# Patient Record
Sex: FEMALE | Race: OTHER | HISPANIC OR LATINO | URBAN - METROPOLITAN AREA
[De-identification: names, ages, dates, MRNs, and addresses within clinical notes are randomized per-mention and may not be internally consistent; named-entity substitution may affect disease eponyms.]

---

## 2018-11-20 ENCOUNTER — OUTPATIENT (OUTPATIENT)
Dept: OUTPATIENT SERVICES | Facility: HOSPITAL | Age: 58
LOS: 1 days | End: 2018-11-20
Payer: COMMERCIAL

## 2018-11-20 PROCEDURE — A9587: CPT

## 2018-11-20 PROCEDURE — 78815 PET IMAGE W/CT SKULL-THIGH: CPT | Mod: 26

## 2018-11-20 PROCEDURE — 78815 PET IMAGE W/CT SKULL-THIGH: CPT

## 2019-01-25 PROBLEM — Z00.00 ENCOUNTER FOR PREVENTIVE HEALTH EXAMINATION: Status: ACTIVE | Noted: 2019-01-25

## 2019-01-31 ENCOUNTER — APPOINTMENT (OUTPATIENT)
Dept: NEPHROLOGY | Facility: CLINIC | Age: 59
End: 2019-01-31
Payer: COMMERCIAL

## 2019-01-31 ENCOUNTER — LABORATORY RESULT (OUTPATIENT)
Age: 59
End: 2019-01-31

## 2019-01-31 VITALS — DIASTOLIC BLOOD PRESSURE: 70 MMHG | SYSTOLIC BLOOD PRESSURE: 113 MMHG | HEART RATE: 60 BPM

## 2019-01-31 VITALS — WEIGHT: 167 LBS

## 2019-01-31 DIAGNOSIS — Z90.2 ACQUIRED ABSENCE OF LUNG [PART OF]: ICD-10-CM

## 2019-01-31 PROCEDURE — 99245 OFF/OP CONSLTJ NEW/EST HI 55: CPT | Mod: 25

## 2019-01-31 PROCEDURE — 36415 COLL VENOUS BLD VENIPUNCTURE: CPT

## 2019-01-31 RX ORDER — SIMVASTATIN 20 MG/1
20 TABLET, FILM COATED ORAL
Qty: 90 | Refills: 3 | Status: ACTIVE | COMMUNITY
Start: 2019-01-31

## 2019-01-31 NOTE — ASSESSMENT
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 The patient has been counseled never to stop taking their medications without discussing it with me or another doctor. The patient has been counseled on risk of acute renal failure and instructed to immediately call and speak with me or go immediately to ER with any severe symptoms, nausea, vomiting, diarrhea, chest pain, or shortness of breath. The patient has been counseled on avoiding NSAIDs. Reducing or avoiding red meat and starting folate 800 mg qd has been advised. A counseling information sheet has been given. All their questions were answered.\par \par # HTN controlled.\par * Stop lisinopril.\par * Change atenolol to toprol (not cleared by kidney).\par * The patient's blood pressure was checked with the [employer] HEM-907XL using the SPRINT trial protocol after sitting quietly in an empty room with arm supported, back supported, and feet on the floor for 5 minutes. The average of 3 readings were taken. \par * The patient has been  advised to check their BP at home with an automatic arm cuff, write down the readings, and reach me directly on the phone immediately if they are persistently > 180 systolic or if SBP is less than 100 or if lightheadedness develops. They were advised to bring in all blood pressure readings and medications next visit.\par * The patient has been counseled that they have a a significant medical condition and regular office followup (at least every 3 weeks for now) is essential for monitoring, and that it is their responsibility to make follow up appointments. The patient also understands that they must never stop or change any medications without discussing this with me (or another physician). A counseling information sheet has been given. All their questions were answered. \par \par # DM.\par * Stop metformin (contraindicated with GFR < 30).\par * As she was only taking 500 mg daily, I do not expect significant rise in blood sugar and will follow HGA1c and BGs.\par \par # High Medical Complexity\par * Diagnostic and management options are extensive (4 stable problems, 2 worsening or uncontrolled problems, or or 1 new problem with workup).\par * The risk of complications, morbidity, or mortality is high. The patient has chronic organ failure (advanced chronic kidney disease) and is at risk for progression.\par * Amount / complexity of data reviewed is extensive. Clinical laboratory tests have been ordered. I have decided to obtain old records. (The patient has been advised to call their office and arrange copies of notes / labs to be sent.) I have reviewed the previous records that are available and summarized them in the HPI. \par \par

## 2019-01-31 NOTE — CONSULT LETTER
[FreeTextEntry1] : Dear Manuel,\par \par I had the pleasure of seeing Kati Alvarez in consultation for kidney disease. My note is attached.\par \par Thank you for the opportunity to participate in the care of your patient.\par \par Please call me on my cell phone at [phone] or in the office at [phone] if you have any questions.  Best regards,\par \par Osbaldo\par \par Osbaldo Hutton MD, FACP, FASN\par [email or web address]\par Office: [phone]\par Mobile: [phone]

## 2019-01-31 NOTE — REVIEW OF SYSTEMS
[Feeling Tired] : feeling tired [Nasal Discharge] : nasal discharge [Easy Bruising] : a tendency for easy bruising [Negative] : Heme/Lymph [FreeTextEntry8] : yeast infection

## 2019-02-12 LAB
25(OH)D3 SERPL-MCNC: 12.8 NG/ML
ALBUMIN SERPL ELPH-MCNC: 4.1 G/DL
ALP BLD-CCNC: 303 U/L
ALT SERPL-CCNC: 13 U/L
ANION GAP SERPL CALC-SCNC: 14 MMOL/L
APPEARANCE: CLEAR
AST SERPL-CCNC: 20 U/L
BASOPHILS # BLD AUTO: 0.08 K/UL
BASOPHILS NFR BLD AUTO: 0.9 %
BILIRUB SERPL-MCNC: 0.4 MG/DL
BILIRUBIN URINE: NEGATIVE
BLOOD URINE: ABNORMAL
BUN SERPL-MCNC: 24 MG/DL
CALCIUM SERPL-MCNC: 9.5 MG/DL
CALCIUM SERPL-MCNC: 9.5 MG/DL
CHLORIDE SERPL-SCNC: 109 MMOL/L
CO2 SERPL-SCNC: 15 MMOL/L
COLOR: YELLOW
CREAT SERPL-MCNC: 2.1 MG/DL
CREAT SPEC-SCNC: 55 MG/DL
CREAT SPEC-SCNC: 55 MG/DL
CREAT/PROT UR: 1.6 RATIO
DEPRECATED KAPPA LC FREE/LAMBDA SER: 1.21 RATIO
EOSINOPHIL # BLD AUTO: 0.23 K/UL
EOSINOPHIL NFR BLD AUTO: 2.6 %
FERRITIN SERPL-MCNC: 95 NG/ML
GLUCOSE QUALITATIVE U: 1000 MG/DL
GLUCOSE SERPL-MCNC: 101 MG/DL
HBA1C MFR BLD HPLC: 6 %
HCT VFR BLD CALC: 37.5 %
HGB BLD-MCNC: 12 G/DL
IGA 24H UR QL IFE: NORMAL
KAPPA LC CSF-MCNC: 4.15 MG/DL
KAPPA LC SERPL-MCNC: 5.02 MG/DL
KETONES URINE: NEGATIVE
LEUKOCYTE ESTERASE URINE: NEGATIVE
LYMPHOCYTES # BLD AUTO: 0.94 K/UL
LYMPHOCYTES NFR BLD AUTO: 10.5 %
M PROTEIN SPEC IFE-MCNC: NORMAL
MAGNESIUM SERPL-MCNC: 2.1 MG/DL
MAN DIFF?: NORMAL
MCHC RBC-ENTMCNC: 28.6 PG
MCHC RBC-ENTMCNC: 32 GM/DL
MCV RBC AUTO: 89.5 FL
MICROALBUMIN 24H UR DL<=1MG/L-MCNC: 12.9 MG/DL
MICROALBUMIN/CREAT 24H UR-RTO: 235 MG/G
MONOCYTES # BLD AUTO: 0.47 K/UL
MONOCYTES NFR BLD AUTO: 5.3 %
NEUTROPHILS # BLD AUTO: 7.12 K/UL
NEUTROPHILS NFR BLD AUTO: 76.3 %
NITRITE URINE: NEGATIVE
PARATHYROID HORMONE INTACT: 250 PG/ML
PH URINE: 6
PHOSPHATE SERPL-MCNC: 2.6 MG/DL
PLATELET # BLD AUTO: 220 K/UL
POTASSIUM SERPL-SCNC: 3.7 MMOL/L
PROT SERPL-MCNC: 7.7 G/DL
PROT UR-MCNC: 86 MG/DL
PROTEIN URINE: 100 MG/DL
RBC # BLD: 4.19 M/UL
RBC # FLD: 16 %
SODIUM SERPL-SCNC: 138 MMOL/L
SPECIFIC GRAVITY URINE: 1.02
T4 FREE SERPL-MCNC: 1 NG/DL
TSH SERPL-ACNC: 2.81 UIU/ML
URATE SERPL-MCNC: 2.5 MG/DL
UROBILINOGEN URINE: NEGATIVE MG/DL
VIT B12 SERPL-MCNC: 1275 PG/ML
WBC # FLD AUTO: 8.92 K/UL

## 2019-02-20 ENCOUNTER — LABORATORY RESULT (OUTPATIENT)
Age: 59
End: 2019-02-20

## 2019-02-20 ENCOUNTER — APPOINTMENT (OUTPATIENT)
Dept: NEPHROLOGY | Facility: CLINIC | Age: 59
End: 2019-02-20
Payer: COMMERCIAL

## 2019-02-20 VITALS — WEIGHT: 170 LBS

## 2019-02-20 PROCEDURE — 36415 COLL VENOUS BLD VENIPUNCTURE: CPT

## 2019-02-20 PROCEDURE — 99215 OFFICE O/P EST HI 40 MIN: CPT | Mod: 25

## 2019-02-20 NOTE — HISTORY OF PRESENT ILLNESS
[FreeTextEntry1] : Kindly referred by Dr. Manuel Arevalo for decrease kidney function. \par \par * HTN controlled, 120s - 130s at home. No lightheadedness. Compliant with medications. Following low salt diet. * Proteinuria 1.6 g/g. *  Metformin was stopped last visit. Nausea resolved.* CKD stable, creatinine 2.1. * HCO3 15. She has not started baking soda as instructed. 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 She has never had flushing or carcinoid syndrome. Since 2013, she has been treated with monthly sandostatin and in 2014 was treated with everolimus (afinitor). In 2017, she was treated with 5FU (Nov 17 - July 18) and another type of chemotherapy, though she is not on this currently. She was diagnosed with diabetes in 2017 and started on metformin, which she is still taking. In 2017 she was also started on lisinopril by her cardiologist "to protect her kidneys". She first became aware of decreased kidney function in about November of 2018. She is uncertain what her creatinine is but believes her kidney function is 17 percent. (See below.) Labs have been requested and are currently pending. A renal ultrasound on 12/3/18 revealed 9.5/9.8 kidneys which were echogenic. There was no hydronephrosis. The patient denies exposure to chronic NSAIDs, phosphate bowel preparations, PPIs, green smoothies, creatine, or herbal supplements.\par \par One episode of kidney stone which passed spontaneously in 2004. \par \par ADDENDUM: Creatinine 2.07, GFR 25 on labs from 1/7/19. Per discussion with Dr. Arevalo, her creatinine has increased in May 2018. It has been stable since November 2018. \par \par \par

## 2019-02-20 NOTE — CONSULT LETTER
[FreeTextEntry1] : Dear Manuel,\par \par I had the pleasure of seeing Kati Alvarez in followup for kidney disease. My note is attached.\par \par Thank you for the opportunity to participate in the care of your patient.\par \par Please call me on my cell phone at [phone] or in the office at [phone] if you have any questions.\par \par Best regards,\par \par Osbaldo\par \par Osbaldo Hutton MD, FACP, FASN\par [email or web address]\par Office: [phone]\par Mobile: [phone]

## 2019-02-20 NOTE — ASSESSMENT
[FreeTextEntry1] : # Stable TE of unclear cause, which nonnephrotic proteinuria \par * This may be due to 5FU, but this is a rare cause of TE.\par * Currently, risks of renal biopsy outweigh benefits given small kidney size.\par * Recheck labs, including other serologic studies.\par \par # HTN controlled.\par * The patient's blood pressure was checked with the [employer] HEM-907XL using the SPRINT trial protocol after sitting quietly in an empty room with arm supported, back supported, and feet on the floor for 5 minutes. The average of 3 readings were taken. \par * The patient has been  advised to check their BP at home with an automatic arm cuff, write down the readings, and reach me directly on the phone immediately if they are persistently > 180 systolic or if SBP is less than 100 or if lightheadedness develops. They were advised to bring in all blood pressure readings and medications next visit.\par * The patient has been counseled that they have a a significant medical condition and regular office followup (at least every 1 months for now) is essential for monitoring, and that it is their responsibility to make follow up appointments. The patient also understands that they must never stop or change any medications without discussing this with me (or another physician). A counseling information sheet has been given. All their questions were answered. \par \par # DM.\par * Follow up labs off metformin.\par * Referral to endocrinology.\par \par # Carcinoid tumor.\par * Follow up with Dr. Arevalo. \par \par # Acidosis.\par * Start sodium bicarb 650 bid. \par \par # High Medical Complexity\par * Diagnostic and management options are extensive (4 stable problems, 2 worsening or uncontrolled problems, or or 1 new problem with workup).\par * The risk of complications, morbidity, or mortality is high. The patient has chronic organ failure (advanced chronic kidney disease) and is at risk for progression.\par \par

## 2019-02-20 NOTE — REVIEW OF SYSTEMS
[Feeling Tired] : feeling tired [Easy Bruising] : a tendency for easy bruising [Negative] : Heme/Lymph

## 2019-03-03 LAB
ALBUMIN SERPL ELPH-MCNC: 4.6 G/DL
ALP BLD-CCNC: 352 U/L
ALT SERPL-CCNC: 14 U/L
ANA SER IF-ACNC: NEGATIVE
ANION GAP SERPL CALC-SCNC: 17 MMOL/L
APPEARANCE: CLEAR
AST SERPL-CCNC: 19 U/L
BASOPHILS # BLD AUTO: 0.05 K/UL
BASOPHILS NFR BLD AUTO: 0.6 %
BILIRUB SERPL-MCNC: 0.5 MG/DL
BILIRUBIN URINE: NEGATIVE
BLOOD URINE: ABNORMAL
BUN SERPL-MCNC: 24 MG/DL
C3 SERPL-MCNC: 144 MG/DL
C4 SERPL-MCNC: 30 MG/DL
CALCIUM SERPL-MCNC: 9.4 MG/DL
CALCIUM SERPL-MCNC: 9.4 MG/DL
CHLORIDE SERPL-SCNC: 109 MMOL/L
CO2 SERPL-SCNC: 15 MMOL/L
COLOR: YELLOW
CREAT SERPL-MCNC: 2.18 MG/DL
CREAT SPEC-SCNC: 87 MG/DL
CREAT/PROT UR: 1.6 RATIO
DSDNA AB SER-ACNC: <12 IU/ML
EOSINOPHIL # BLD AUTO: 0.13 K/UL
EOSINOPHIL NFR BLD AUTO: 1.7 %
GLUCOSE QUALITATIVE U: ABNORMAL
GLUCOSE SERPL-MCNC: 124 MG/DL
HBV SURFACE AG SER QL: NONREACTIVE
HCT VFR BLD CALC: 40.3 %
HCV AB SER QL: NONREACTIVE
HCV S/CO RATIO: 0.15 S/CO
HGB BLD-MCNC: 11.9 G/DL
HIV1+2 AB SPEC QL IA.RAPID: NONREACTIVE
IMM GRANULOCYTES NFR BLD AUTO: 1.4 %
KETONES URINE: NEGATIVE
LEUKOCYTE ESTERASE URINE: ABNORMAL
LYMPHOCYTES # BLD AUTO: 0.87 K/UL
LYMPHOCYTES NFR BLD AUTO: 11.2 %
MAN DIFF?: NORMAL
MCHC RBC-ENTMCNC: 28.5 PG
MCHC RBC-ENTMCNC: 29.5 GM/DL
MCV RBC AUTO: 96.6 FL
MONOCYTES # BLD AUTO: 0.64 K/UL
MONOCYTES NFR BLD AUTO: 8.2 %
MPO AB + PR3 PNL SER: NORMAL
NEUTROPHILS # BLD AUTO: 5.97 K/UL
NEUTROPHILS NFR BLD AUTO: 76.9 %
NITRITE URINE: NEGATIVE
PARATHYROID HORMONE INTACT: 252 PG/ML
PH URINE: 6
PHOSPHATE SERPL-MCNC: 2.5 MG/DL
PLATELET # BLD AUTO: 236 K/UL
POTASSIUM SERPL-SCNC: 4.2 MMOL/L
PROT SERPL-MCNC: 7.4 G/DL
PROT UR-MCNC: 139 MG/DL
PROTEIN URINE: ABNORMAL
RBC # BLD: 4.17 M/UL
RBC # FLD: 15.8 %
RHEUMATOID FACT SER QL: <10 IU/ML
SODIUM SERPL-SCNC: 141 MMOL/L
SPECIFIC GRAVITY URINE: 1.02
UROBILINOGEN URINE: NORMAL
WBC # FLD AUTO: 7.77 K/UL

## 2019-03-18 ENCOUNTER — APPOINTMENT (OUTPATIENT)
Dept: GYNECOLOGIC ONCOLOGY | Facility: CLINIC | Age: 59
End: 2019-03-18
Payer: COMMERCIAL

## 2019-03-18 VITALS
BODY MASS INDEX: 29.53 KG/M2 | OXYGEN SATURATION: 98 % | HEIGHT: 64 IN | SYSTOLIC BLOOD PRESSURE: 133 MMHG | WEIGHT: 173 LBS | DIASTOLIC BLOOD PRESSURE: 84 MMHG | HEART RATE: 78 BPM

## 2019-03-18 PROCEDURE — 99205 OFFICE O/P NEW HI 60 MIN: CPT

## 2019-03-18 RX ORDER — LEVOTHYROXINE SODIUM 50 UG/1
50 TABLET ORAL DAILY
Qty: 90 | Refills: 3 | Status: COMPLETED | COMMUNITY
Start: 2019-01-31 | End: 2019-03-18

## 2019-03-18 RX ORDER — LANREOTIDE ACETATE 120 MG/.5ML
INJECTION SUBCUTANEOUS
Refills: 0 | Status: ACTIVE | COMMUNITY

## 2019-03-18 RX ORDER — OMEPRAZOLE MAGNESIUM 10 MG/1
10 GRANULE, DELAYED RELEASE ORAL
Refills: 0 | Status: ACTIVE | COMMUNITY

## 2019-03-19 ENCOUNTER — RESULT CHARGE (OUTPATIENT)
Age: 59
End: 2019-03-19

## 2019-03-20 NOTE — PHYSICAL EXAM
[Normal] : Recto-Vaginal Exam: Normal [de-identified] : vertical and pfannensteil scars from prior csections [de-identified] : 8 week size uterus, anterior, possibly with scar to anterior abdominal wall.

## 2019-03-20 NOTE — REVIEW OF SYSTEMS
[Negative] : Musculoskeletal [Easy Bruising] : easy bruising [Recent Wt Gain ___ Lbs] : recent weight gain of [unfilled] lbs [FreeTextEntry4] : frequency [FreeTextEntry2] : sometimes headaches

## 2019-03-20 NOTE — HISTORY OF PRESENT ILLNESS
[FreeTextEntry1] : Problem\par 1)Complex atypical hyperplasia \par 2)Endometrial polyp\par \par Previous Therapy\par 1)Pap 2/18/19\par    a)NILM, HPV Negative \par 2)EMB 2/27/19\par    a)Focal complex atypical endometrial hyperplasia, disordered proliferative endometrium with metaplasia features suggestive of endometrial polyp

## 2019-03-20 NOTE — ASSESSMENT
[FreeTextEntry1] : With the aid of diagrams, we reviewed the findings and discussed the pathology results. \par Endometrial hyperplasia is caused by an imbalance between estrogen and progesterone, often described as “unopposed estrogen.” The risk of complex hyperplasia with atypia progressing to endometrial cancer is above 30%. Recent data has shown that there is a 40% chance that endometrial cancer will be discovered in the uterus at the time of surgery for complex atypical hyperplasia.\par \par Recommendation is for surgical removal of the uterus with removal of the bilateral tubes and ovaries. Different surgical approaches discussed including minimally invasive and open approaches. I recommend advanced laparoscopic robotic assisted total hysterectomy, bilateral salpingo-oophorectomy with sentinel lymph node biopsy. If sentinel lymph node biopsy is successful a full lymph node dissection can be avoided. If the SLN does not map, then frozen section will be performed on uterus. If there is evidence of malignancy with risk factors that render her high intermediate risk, a pelvic and possible para-aortic lymph node dissection will be performed. \par \par Hormonal treatment also discussed, but this would not be standard of care in a post-menopausal patient. Progestin therapies can be considered for patients who are poor surgical candidates, however risk of recurrent disease emphasized.\par \par Surgical Complications that include, but are not limited to: bleeding, infection, injury to other organs including bowel, bladder, ureters, blood vessels, nerves, infections, blood clots, lymphedema, pneumonia, wound complications and prolonged hospital stay have all been discussed with the patient. I have also provided her with the diagrams.\par \par I discussed her case with Dr. Pierce who describes her carcinoid history as indolent and believes she is stable enough to proceed with surgery.\par \par [] Discuss case with Dr Pierce [phone] and Dr. White [phone]\par [] Recommend TLH/BSO/SLNB\par [] Imaging studies requested from [facility]\par [] Medical clearance\par \par \par The total encounter time was 70 minutes, of which over 50% was spent face-to-face, examining and counseling the patient, or coordinating care.

## 2019-03-20 NOTE — CHIEF COMPLAINT
[FreeTextEntry1] : New patient consult. 58 y.o patient referred by Dr. Garcia presents today for CAH. patient reports an episode of bleeding last July while on chemotherapy for management of carcinoid. She was first diagnosed with a carcinoid tumor of the lung in , she then had removal of a tumor from her heart and most recently has multiple tumor nodules on the liver. She had chemotherapy with 5-FU for 9 months, last treatment was in 2018. Reports stable disease off treatment. \par \par GynHx: CAH\par ObHx:  Section x2 (one child passed away after delivery)\par PmHx: CKD, hypothyroid, HTN, high cholesterol\par SurgHx: \par Meds: Synthroid, Toprol, Simvastatin, Aspring, Prilosec, Sumatolin\par All: PCN\par \par

## 2019-03-20 NOTE — DISCUSSION/SUMMARY
[Pre Op] : The differential diagnosis was discussed in detail. The indications, risks, benefits and alternatives were discussed. [unfilled] expressed an understanding of the treatment rationale and her questions were answered to her apparent satisfaction. [FreeTextEntry2] : CAH [FreeTextEntry1] : With the aid of diagrams, we reviewed the findings and discussed the pathology results. \par Endometrial hyperplasia is caused by an imbalance between estrogen and progesterone, often described as “unopposed estrogen.” The risk of complex hyperplasia with atypia progressing to endometrial cancer is above 30%. Recent data has shown that there is a 40% chance that endometrial cancer will be discovered in the uterus at the time of surgery for complex atypical hyperplasia.\par \par Recommendation is for surgical removal of the uterus with removal of the bilateral tubes and ovaries. Different surgical approaches discussed including minimally invasive and open approaches. I recommend advanced laparoscopic robotic assisted total hysterectomy, bilateral salpingo-oophorectomy with sentinel lymph node biopsy. If sentinel lymph node biopsy is successful a full lymph node dissection can be avoided. If the SLN does not map, then frozen section will be performed on uterus. If there is evidence of malignancy with risk factors that render her high intermediate risk, a pelvic and possible para-aortic lymph node dissection will be performed. \par \par Surgical Complications that include, but are not limited to: bleeding, infection, injury to other organs including bowel, bladder, ureters, blood vessels, nerves, infections, blood clots, lymphedema, pneumonia, wound complications and prolonged hospital stay have all been discussed with the patient. I have also provided her with the diagrams.\par \par Surgical scheduling was discussed and instructions for optimization prior to surgery were given. She will have a clear liquid diet one day prior, and will follow the Enhanced Recovery after Surgery (ERAS) protocol.  No aspirin or NSAID products for 1 week prior. She will choose a surgical date.\par Slide review and medical clearance. \par \par The total encounter time was 60 minutes, of which over 50% was spent face-to-face, examining and counseling the patient, or coordinating care.  \par

## 2019-03-26 ENCOUNTER — APPOINTMENT (OUTPATIENT)
Dept: NEPHROLOGY | Facility: CLINIC | Age: 59
End: 2019-03-26
Payer: COMMERCIAL

## 2019-03-26 ENCOUNTER — LABORATORY RESULT (OUTPATIENT)
Age: 59
End: 2019-03-26

## 2019-03-26 VITALS — BODY MASS INDEX: 29.52 KG/M2 | WEIGHT: 172 LBS

## 2019-03-26 VITALS — HEART RATE: 77 BPM | SYSTOLIC BLOOD PRESSURE: 121 MMHG | DIASTOLIC BLOOD PRESSURE: 76 MMHG

## 2019-03-26 DIAGNOSIS — N17.9 ACUTE KIDNEY FAILURE, UNSPECIFIED: ICD-10-CM

## 2019-03-26 PROCEDURE — 99215 OFFICE O/P EST HI 40 MIN: CPT | Mod: 25

## 2019-03-26 PROCEDURE — 36415 COLL VENOUS BLD VENIPUNCTURE: CPT

## 2019-03-26 NOTE — ASSESSMENT
[FreeTextEntry1] : # Stable TE and stage 4 CKD of unclear cause. \par * Nonnephrotic proteinuria. Serologies negative.\par * This may be due to 5FU, but this is a rare cause of TE.\par * Currently, risks of renal biopsy outweigh benefits given smaller kidney size and echogenicity, which suggests chronicity.\par * The patient has been counseled that chronic kidney disease is a significant condition and regular office followup with me (at least every 1 - 2 months for now) is essential for monitoring, and that it is their responsibility to make a follow up appointment.\par * The patient has been counseled never to stop taking their medications without discussing it with me or another doctor.\par * The patient has been counseled on risk of acute renal failure and instructed to immediately call and speak with me or go immediately to ER with any severe symptoms, nausea, vomiting, diarrhea, chest pain, or shortness of breath.\par * The patient has been counseled on avoiding NSAIDs.\par * Reducing or avoiding red meat, following a relatively low oxalate diet, and starting folate 800 mg qd has been advised.\par * A counseling information sheet has been given and they were provided sufficient time to review this. All their questions were answered.\par \par # HTN controlled.\par * The patient's blood pressure was checked with the [employer] HEM-907XL using the SPRINT trial protocol after sitting quietly in an empty room with arm supported, back supported, and feet on the floor for 5 minutes. The average of 3 readings were taken. \par * The patient has been advised to check their BP at home with an automatic arm cuff, write down the readings, and reach me directly on the phone immediately if they are persistently > 180 systolic or if SBP is less than 100 or if lightheadedness develops. They were advised to bring in all blood pressure readings and medications next visit.\par * The patient has been counseled that they have a a significant medical condition and regular office followup (at least every 2 months for now) is essential for monitoring, and that it is their responsibility to make follow up appointments.\par * The patient also has  been counsled that they must never stop or change any medications without discussing this with me (or another physician). \par * A counseling information sheet has been given and they were provided sufficient time to review this sheet. All their questions were answered.\par \par # DM.\par * Check HGA1c off metformin.\par * Endocrinology referral.\par \par # Carcinoid tumor.\par * Follow up closely with Dr. Arevalo.\par \par * Acidosis.\par * Recheck HCO3 on bicarb.\par \par # High Medical Complexity\par * Diagnostic and management options are extensive (4 stable problems, 2 worsening or uncontrolled problems, or 1 new problem with workup).\par * The risk of complications, morbidity, or mortality is high. The patient has chronic organ failure (advanced chronic kidney disease) and is at risk for progression.\par

## 2019-03-26 NOTE — CONSULT LETTER
[FreeTextEntry1] : Dear Dr. Arevalo,\par \par I had the pleasure of seeing Kati Alvarez in followup for kidney disease. My note is attached.\par \par Thank you for the opportunity to participate in the care of your patient.\par \par Please call me on my cell phone at [phone] or in the office at [phone] if you have any questions.\par \par Best regards,\par \par Osbaldo\par \par Osbaldo Hutton MD, FACP, FASN\par [email or web address]\par Office: [phone]\par Mobile: [phone]

## 2019-03-26 NOTE — HISTORY OF PRESENT ILLNESS
[FreeTextEntry1] : Kindly referred by Dr. Manuel Arevalo for decrease kidney function. \par \par * Progressive CKD. Creatinine 2.18. * HCO3 15 previously. Baking soda 650 bid started.  * HTN controlled. No lightheadedness. Compliant with medications. Following low salt diet. * Off metformin. * 1.6 g/g proteinuria. \par \par Previous history (20Feb19): * HTN controlled, 120s - 130s at home. No lightheadedness. Compliant with medications. Following low salt diet. * Proteinuria 1.6 g/g. *  Metformin was stopped last visit. Nausea resolved.* CKD stable, creatinine 2.1. * HCO3 15. She has not started baking soda as instructed. 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 She has never had flushing or carcinoid syndrome. Since 2013, she has been treated with monthly sandostatin and in 2014 was treated with everolimus (afinitor). In 2017, she was treated with 5FU (Nov 17 - July 18) and another type of chemotherapy, though she is not on this currently. She was diagnosed with diabetes in 2017 and started on metformin, which she is still taking. In 2017 she was also started on lisinopril by her cardiologist "to protect her kidneys". She first became aware of decreased kidney function in about November of 2018. She is uncertain what her creatinine is but believes her kidney function is 17 percent. (See below.) Labs have been requested and are currently pending. A renal ultrasound on 12/3/18 revealed 9.5/9.8 kidneys which were echogenic. There was no hydronephrosis. The patient denies exposure to chronic NSAIDs, phosphate bowel preparations, PPIs, green smoothies, creatine, or herbal supplements.\par \par One episode of kidney stone which passed spontaneously in 2004. \par \par ADDENDUM: Creatinine 2.07, GFR 25 on labs from 1/7/19. Per discussion with Dr. Arevalo, her creatinine has increased in May 2018. It has been stable since November 2018. \par \par \par

## 2019-03-27 ENCOUNTER — EMERGENCY (EMERGENCY)
Facility: HOSPITAL | Age: 59
LOS: 1 days | Discharge: HOME | End: 2019-03-27
Attending: EMERGENCY MEDICINE | Admitting: EMERGENCY MEDICINE
Payer: COMMERCIAL

## 2019-03-27 VITALS
WEIGHT: 172.62 LBS | SYSTOLIC BLOOD PRESSURE: 137 MMHG | RESPIRATION RATE: 18 BRPM | HEIGHT: 63 IN | OXYGEN SATURATION: 100 % | HEART RATE: 85 BPM | TEMPERATURE: 98 F | DIASTOLIC BLOOD PRESSURE: 82 MMHG

## 2019-03-27 VITALS
RESPIRATION RATE: 16 BRPM | OXYGEN SATURATION: 100 % | HEART RATE: 66 BPM | SYSTOLIC BLOOD PRESSURE: 133 MMHG | DIASTOLIC BLOOD PRESSURE: 75 MMHG

## 2019-03-27 DIAGNOSIS — N18.9 CHRONIC KIDNEY DISEASE, UNSPECIFIED: ICD-10-CM

## 2019-03-27 DIAGNOSIS — E78.5 HYPERLIPIDEMIA, UNSPECIFIED: ICD-10-CM

## 2019-03-27 DIAGNOSIS — I12.9 HYPERTENSIVE CHRONIC KIDNEY DISEASE WITH STAGE 1 THROUGH STAGE 4 CHRONIC KIDNEY DISEASE, OR UNSPECIFIED CHRONIC KIDNEY DISEASE: ICD-10-CM

## 2019-03-27 DIAGNOSIS — R79.9 ABNORMAL FINDING OF BLOOD CHEMISTRY, UNSPECIFIED: ICD-10-CM

## 2019-03-27 DIAGNOSIS — E11.9 TYPE 2 DIABETES MELLITUS WITHOUT COMPLICATIONS: ICD-10-CM

## 2019-03-27 DIAGNOSIS — R11.10 VOMITING, UNSPECIFIED: ICD-10-CM

## 2019-03-27 DIAGNOSIS — E87.6 HYPOKALEMIA: ICD-10-CM

## 2019-03-27 DIAGNOSIS — Z88.0 ALLERGY STATUS TO PENICILLIN: ICD-10-CM

## 2019-03-27 LAB
ALBUMIN SERPL ELPH-MCNC: 4.1 G/DL — SIGNIFICANT CHANGE UP (ref 3.3–5)
ALBUMIN SERPL ELPH-MCNC: 4.2 G/DL
ALP BLD-CCNC: 343 U/L
ALP SERPL-CCNC: 315 U/L — HIGH (ref 40–120)
ALT FLD-CCNC: 16 U/L — SIGNIFICANT CHANGE UP (ref 10–45)
ALT SERPL-CCNC: 18 U/L
ANION GAP SERPL CALC-SCNC: 13 MMOL/L — SIGNIFICANT CHANGE UP (ref 5–17)
ANION GAP SERPL CALC-SCNC: 15 MMOL/L
APPEARANCE: ABNORMAL
AST SERPL-CCNC: 19 U/L — SIGNIFICANT CHANGE UP (ref 10–40)
AST SERPL-CCNC: 20 U/L
BASOPHILS # BLD AUTO: 0.05 K/UL
BASOPHILS # BLD AUTO: 0.05 K/UL — SIGNIFICANT CHANGE UP (ref 0–0.2)
BASOPHILS NFR BLD AUTO: 0.6 %
BASOPHILS NFR BLD AUTO: 0.6 % — SIGNIFICANT CHANGE UP (ref 0–2)
BILIRUB SERPL-MCNC: 0.3 MG/DL
BILIRUB SERPL-MCNC: 0.4 MG/DL — SIGNIFICANT CHANGE UP (ref 0.2–1.2)
BILIRUBIN URINE: NEGATIVE
BLOOD URINE: ABNORMAL
BUN SERPL-MCNC: 16 MG/DL
BUN SERPL-MCNC: 18 MG/DL — SIGNIFICANT CHANGE UP (ref 7–23)
CALCIUM SERPL-MCNC: 8.9 MG/DL
CALCIUM SERPL-MCNC: 8.9 MG/DL
CALCIUM SERPL-MCNC: 8.9 MG/DL — SIGNIFICANT CHANGE UP (ref 8.4–10.5)
CHLORIDE SERPL-SCNC: 107 MMOL/L
CHLORIDE SERPL-SCNC: 109 MMOL/L — HIGH (ref 96–108)
CO2 SERPL-SCNC: 15 MMOL/L
CO2 SERPL-SCNC: 17 MMOL/L — LOW (ref 22–31)
COLOR: YELLOW
CREAT SERPL-MCNC: 2.21 MG/DL
CREAT SERPL-MCNC: 2.25 MG/DL — HIGH (ref 0.5–1.3)
EOSINOPHIL # BLD AUTO: 0.24 K/UL
EOSINOPHIL # BLD AUTO: 0.25 K/UL — SIGNIFICANT CHANGE UP (ref 0–0.5)
EOSINOPHIL NFR BLD AUTO: 2.9 %
EOSINOPHIL NFR BLD AUTO: 3.1 % — SIGNIFICANT CHANGE UP (ref 0–6)
ESTIMATED AVERAGE GLUCOSE: 128 MG/DL
GLUCOSE QUALITATIVE U: ABNORMAL
GLUCOSE SERPL-MCNC: 203 MG/DL
GLUCOSE SERPL-MCNC: 221 MG/DL — HIGH (ref 70–99)
HBA1C MFR BLD HPLC: 6.1 %
HCT VFR BLD CALC: 37.3 % — SIGNIFICANT CHANGE UP (ref 34.5–45)
HCT VFR BLD CALC: 38.6 %
HGB BLD-MCNC: 12.1 G/DL
HGB BLD-MCNC: 12.3 G/DL — SIGNIFICANT CHANGE UP (ref 11.5–15.5)
IMM GRANULOCYTES NFR BLD AUTO: 1 %
IMM GRANULOCYTES NFR BLD AUTO: 1 % — SIGNIFICANT CHANGE UP (ref 0–1.5)
KETONES URINE: NEGATIVE
LEUKOCYTE ESTERASE URINE: NEGATIVE
LYMPHOCYTES # BLD AUTO: 0.86 K/UL
LYMPHOCYTES # BLD AUTO: 1.02 K/UL — SIGNIFICANT CHANGE UP (ref 1–3.3)
LYMPHOCYTES # BLD AUTO: 12.5 % — LOW (ref 13–44)
LYMPHOCYTES NFR BLD AUTO: 10.2 %
MAGNESIUM SERPL-MCNC: 2 MG/DL
MAN DIFF?: NORMAL
MCHC RBC-ENTMCNC: 29 PG
MCHC RBC-ENTMCNC: 29.6 PG — SIGNIFICANT CHANGE UP (ref 27–34)
MCHC RBC-ENTMCNC: 31.3 GM/DL
MCHC RBC-ENTMCNC: 33 GM/DL — SIGNIFICANT CHANGE UP (ref 32–36)
MCV RBC AUTO: 89.7 FL — SIGNIFICANT CHANGE UP (ref 80–100)
MCV RBC AUTO: 92.6 FL
MONOCYTES # BLD AUTO: 0.58 K/UL — SIGNIFICANT CHANGE UP (ref 0–0.9)
MONOCYTES # BLD AUTO: 0.68 K/UL
MONOCYTES NFR BLD AUTO: 7.1 % — SIGNIFICANT CHANGE UP (ref 2–14)
MONOCYTES NFR BLD AUTO: 8.1 %
NEUTROPHILS # BLD AUTO: 6.15 K/UL — SIGNIFICANT CHANGE UP (ref 1.8–7.4)
NEUTROPHILS # BLD AUTO: 6.5 K/UL
NEUTROPHILS NFR BLD AUTO: 75.7 % — SIGNIFICANT CHANGE UP (ref 43–77)
NEUTROPHILS NFR BLD AUTO: 77.2 %
NITRITE URINE: NEGATIVE
NRBC # BLD: 0 /100 WBCS — SIGNIFICANT CHANGE UP (ref 0–0)
PARATHYROID HORMONE INTACT: 359 PG/ML
PH URINE: 6
PHOSPHATE SERPL-MCNC: 2.5 MG/DL
PLATELET # BLD AUTO: 215 K/UL — SIGNIFICANT CHANGE UP (ref 150–400)
PLATELET # BLD AUTO: 243 K/UL
POTASSIUM SERPL-MCNC: 3 MMOL/L — LOW (ref 3.5–5.3)
POTASSIUM SERPL-SCNC: 2.9 MMOL/L
POTASSIUM SERPL-SCNC: 3 MMOL/L — LOW (ref 3.5–5.3)
PROT SERPL-MCNC: 7.3 G/DL
PROT SERPL-MCNC: 8 G/DL — SIGNIFICANT CHANGE UP (ref 6–8.3)
PROTEIN URINE: ABNORMAL
RBC # BLD: 4.16 M/UL — SIGNIFICANT CHANGE UP (ref 3.8–5.2)
RBC # BLD: 4.17 M/UL
RBC # FLD: 15.3 % — HIGH (ref 10.3–14.5)
RBC # FLD: 15.6 %
SODIUM SERPL-SCNC: 137 MMOL/L
SODIUM SERPL-SCNC: 139 MMOL/L — SIGNIFICANT CHANGE UP (ref 135–145)
SPECIFIC GRAVITY URINE: 1.03
UROBILINOGEN URINE: NORMAL
WBC # BLD: 8.13 K/UL — SIGNIFICANT CHANGE UP (ref 3.8–10.5)
WBC # FLD AUTO: 8.13 K/UL — SIGNIFICANT CHANGE UP (ref 3.8–10.5)
WBC # FLD AUTO: 8.41 K/UL

## 2019-03-27 PROCEDURE — 85025 COMPLETE CBC W/AUTO DIFF WBC: CPT

## 2019-03-27 PROCEDURE — 93010 ELECTROCARDIOGRAM REPORT: CPT

## 2019-03-27 PROCEDURE — 96375 TX/PRO/DX INJ NEW DRUG ADDON: CPT

## 2019-03-27 PROCEDURE — 99284 EMERGENCY DEPT VISIT MOD MDM: CPT | Mod: 25

## 2019-03-27 PROCEDURE — 80053 COMPREHEN METABOLIC PANEL: CPT

## 2019-03-27 PROCEDURE — 96365 THER/PROPH/DIAG IV INF INIT: CPT

## 2019-03-27 PROCEDURE — 96376 TX/PRO/DX INJ SAME DRUG ADON: CPT

## 2019-03-27 PROCEDURE — 93005 ELECTROCARDIOGRAM TRACING: CPT

## 2019-03-27 PROCEDURE — 83735 ASSAY OF MAGNESIUM: CPT

## 2019-03-27 PROCEDURE — 36415 COLL VENOUS BLD VENIPUNCTURE: CPT

## 2019-03-27 RX ORDER — POTASSIUM CHLORIDE 20 MEQ
10 PACKET (EA) ORAL ONCE
Qty: 0 | Refills: 0 | Status: COMPLETED | OUTPATIENT
Start: 2019-03-27 | End: 2019-03-27

## 2019-03-27 RX ORDER — POTASSIUM CHLORIDE 20 MEQ
40 PACKET (EA) ORAL ONCE
Qty: 0 | Refills: 0 | Status: COMPLETED | OUTPATIENT
Start: 2019-03-27 | End: 2019-03-27

## 2019-03-27 RX ORDER — POTASSIUM CHLORIDE 20 MEQ
1 PACKET (EA) ORAL
Qty: 14 | Refills: 0 | OUTPATIENT
Start: 2019-03-27 | End: 2019-04-09

## 2019-03-27 RX ORDER — METOCLOPRAMIDE HCL 10 MG
10 TABLET ORAL ONCE
Qty: 0 | Refills: 0 | Status: COMPLETED | OUTPATIENT
Start: 2019-03-27 | End: 2019-03-27

## 2019-03-27 RX ORDER — SODIUM CHLORIDE 9 MG/ML
1000 INJECTION INTRAMUSCULAR; INTRAVENOUS; SUBCUTANEOUS ONCE
Qty: 0 | Refills: 0 | Status: COMPLETED | OUTPATIENT
Start: 2019-03-27 | End: 2019-03-27

## 2019-03-27 RX ORDER — POTASSIUM CHLORIDE 20 MEQ
40 PACKET (EA) ORAL ONCE
Qty: 0 | Refills: 0 | Status: DISCONTINUED | OUTPATIENT
Start: 2019-03-27 | End: 2019-03-27

## 2019-03-27 RX ORDER — ONDANSETRON 8 MG/1
4 TABLET, FILM COATED ORAL ONCE
Qty: 0 | Refills: 0 | Status: COMPLETED | OUTPATIENT
Start: 2019-03-27 | End: 2019-03-27

## 2019-03-27 RX ADMIN — ONDANSETRON 4 MILLIGRAM(S): 8 TABLET, FILM COATED ORAL at 14:22

## 2019-03-27 RX ADMIN — SODIUM CHLORIDE 1000 MILLILITER(S): 9 INJECTION INTRAMUSCULAR; INTRAVENOUS; SUBCUTANEOUS at 15:36

## 2019-03-27 RX ADMIN — SODIUM CHLORIDE 1000 MILLILITER(S): 9 INJECTION INTRAMUSCULAR; INTRAVENOUS; SUBCUTANEOUS at 14:14

## 2019-03-27 RX ADMIN — Medication 40 MILLIEQUIVALENT(S): at 17:24

## 2019-03-27 RX ADMIN — Medication 10 MILLIGRAM(S): at 15:29

## 2019-03-27 RX ADMIN — Medication 100 MILLIEQUIVALENT(S): at 14:07

## 2019-03-27 RX ADMIN — Medication 10 MILLIEQUIVALENT(S): at 15:31

## 2019-03-27 RX ADMIN — Medication 100 MILLIEQUIVALENT(S): at 15:32

## 2019-03-27 NOTE — ED ADULT NURSE NOTE - NSIMPLEMENTINTERV_GEN_ALL_ED
Implemented All Universal Safety Interventions:  D Hanis to call system. Call bell, personal items and telephone within reach. Instruct patient to call for assistance. Room bathroom lighting operational. Non-slip footwear when patient is off stretcher. Physically safe environment: no spills, clutter or unnecessary equipment. Stretcher in lowest position, wheels locked, appropriate side rails in place.

## 2019-03-27 NOTE — ED PROVIDER NOTE - OBJECTIVE STATEMENT
57yo female with pmhx of carcinoid s/p L pulmonectomy, CKD, DM, HTN, HLD presents with hypokalemia. Pt sees nephrologist Dr. Osblado Hutton monthly for monitoring of renal function, went yesterday for routine bloodwork. Called today and told potassium was low and she should come to ED. Pt reports she otherwise feels in normal state of health. Has had runny nose over past week and several episodes of vomiting, but denies fever, chills, chest pain, shortness of breath, constipation, diarrhea, urinary symptoms. States she  has had hypokalemia once previously during a prior hospitalization.

## 2019-03-27 NOTE — ED PROVIDER NOTE - NSFOLLOWUPINSTRUCTIONS_ED_ALL_ED_FT
Take potassium chloride 40 mEq daily  Follow up with Dr. Hutton in 2 weeks    Return to the Emergency Department if you develop fever>100.4F, chest pain, shortness of breath, abdominal pain, or any other concerns

## 2019-03-27 NOTE — ED ADULT NURSE NOTE - CHPI ED NUR SYMPTOMS NEG
no nausea/no pain/no back pain/no vomiting/no fever/no headache/no loss of consciousness/no chills/no decreased eating/drinking/no dizziness

## 2019-03-27 NOTE — ED PROVIDER NOTE - ATTENDING CONTRIBUTION TO CARE
57yo female with pmhx of carcinoid s/p L pulmonectomy, CKD, DM, HTN, HLD presents with hypokalemia. Pt sees nephrologist Dr. Osbaldo Hutton monthly for monitoring of renal function, went yesterday for routine bloodwork. Called today and told potassium was low and she should come to ED. Pt reports she otherwise feels in normal state of health. Has had runny nose over past week and several episodes of vomiting, but denies fever, chills, chest pain, shortness of breath, constipation, diarrhea, urinary symptoms. States she  has had hypokalemia once previously during a prior hospitalization.    Pe as per pa  Labs show hypokalemia to 3.0 replaced in ED  PO also given  Case discussed with Dr. Hutton who agrees on k supplementation and follow up in 2 weeks for repeat K  Pt otherwise aysmptomatic

## 2019-03-27 NOTE — ED ADULT NURSE NOTE - PMH
Carcinoid tumor Carcinoid tumor    CKD (chronic kidney disease)    DM (diabetes mellitus)    HTN (hypertension)

## 2019-03-27 NOTE — ED PROVIDER NOTE - CLINICAL SUMMARY MEDICAL DECISION MAKING FREE TEXT BOX
No U waves on EKG Afebrile and hemodynamically stable. K+ 3.0 on BMP in ED. No U waves on EKG. She has known CKD with Cr 2.2, mag wnl. She was unable to tolerate potassium solution in ED so given potassium chloride 40 mEq tab and repleted with potassium chloride 10mg IV x2. Discussed with Dr. Hutton who recommends continued supplementation with potassium chloride 40 mEq po daily with follow up in 2 weeks. Discussed plan with patient. Return precautions given.

## 2019-04-06 LAB
CREAT SPEC-SCNC: 89 MG/DL
CREAT SPEC-SCNC: 89 MG/DL
CREAT/PROT UR: 1.5 RATIO
MICROALBUMIN 24H UR DL<=1MG/L-MCNC: 21.3 MG/DL
MICROALBUMIN/CREAT 24H UR-RTO: 252 MG/G
PROT UR-MCNC: 132 MG/DL

## 2019-04-10 ENCOUNTER — OUTPATIENT (OUTPATIENT)
Dept: OUTPATIENT SERVICES | Facility: HOSPITAL | Age: 59
LOS: 1 days | End: 2019-04-10
Payer: COMMERCIAL

## 2019-04-10 DIAGNOSIS — Z01.818 ENCOUNTER FOR OTHER PREPROCEDURAL EXAMINATION: ICD-10-CM

## 2019-04-10 DIAGNOSIS — N85.02 ENDOMETRIAL INTRAEPITHELIAL NEOPLASIA [EIN]: ICD-10-CM

## 2019-04-10 PROBLEM — I10 ESSENTIAL (PRIMARY) HYPERTENSION: Chronic | Status: ACTIVE | Noted: 2019-03-27

## 2019-04-10 PROBLEM — N18.9 CHRONIC KIDNEY DISEASE, UNSPECIFIED: Chronic | Status: ACTIVE | Noted: 2019-03-27

## 2019-04-10 PROBLEM — D3A.00 BENIGN CARCINOID TUMOR OF UNSPECIFIED SITE: Chronic | Status: ACTIVE | Noted: 2019-03-27

## 2019-04-10 PROBLEM — E11.9 TYPE 2 DIABETES MELLITUS WITHOUT COMPLICATIONS: Chronic | Status: ACTIVE | Noted: 2019-03-27

## 2019-04-10 LAB
ALBUMIN SERPL ELPH-MCNC: 4.4 G/DL — SIGNIFICANT CHANGE UP (ref 3.3–5)
ALP SERPL-CCNC: 329 U/L — HIGH (ref 40–120)
ALT FLD-CCNC: 13 U/L — SIGNIFICANT CHANGE UP (ref 10–45)
ANION GAP SERPL CALC-SCNC: 14 MMOL/L — SIGNIFICANT CHANGE UP (ref 5–17)
AST SERPL-CCNC: 15 U/L — SIGNIFICANT CHANGE UP (ref 10–40)
BILIRUB SERPL-MCNC: 0.5 MG/DL — SIGNIFICANT CHANGE UP (ref 0.2–1.2)
BUN SERPL-MCNC: 28 MG/DL — HIGH (ref 7–23)
CALCIUM SERPL-MCNC: 9.2 MG/DL — SIGNIFICANT CHANGE UP (ref 8.4–10.5)
CHLORIDE SERPL-SCNC: 112 MMOL/L — HIGH (ref 96–108)
CO2 SERPL-SCNC: 17 MMOL/L — LOW (ref 22–31)
CREAT SERPL-MCNC: 2.07 MG/DL — HIGH (ref 0.5–1.3)
GLUCOSE SERPL-MCNC: 99 MG/DL — SIGNIFICANT CHANGE UP (ref 70–99)
INR BLD: 0.96 — SIGNIFICANT CHANGE UP (ref 0.88–1.16)
POTASSIUM SERPL-MCNC: 3.9 MMOL/L — SIGNIFICANT CHANGE UP (ref 3.5–5.3)
POTASSIUM SERPL-SCNC: 3.9 MMOL/L — SIGNIFICANT CHANGE UP (ref 3.5–5.3)
PROT SERPL-MCNC: 7.3 G/DL — SIGNIFICANT CHANGE UP (ref 6–8.3)
PROTHROM AB SERPL-ACNC: 10.8 SEC — SIGNIFICANT CHANGE UP (ref 10–12.9)
SODIUM SERPL-SCNC: 143 MMOL/L — SIGNIFICANT CHANGE UP (ref 135–145)

## 2019-04-10 PROCEDURE — 80053 COMPREHEN METABOLIC PANEL: CPT

## 2019-04-10 PROCEDURE — 85610 PROTHROMBIN TIME: CPT

## 2019-04-10 PROCEDURE — 71046 X-RAY EXAM CHEST 2 VIEWS: CPT | Mod: 26

## 2019-04-10 PROCEDURE — 71046 X-RAY EXAM CHEST 2 VIEWS: CPT

## 2019-04-18 ENCOUNTER — INPATIENT (INPATIENT)
Facility: HOSPITAL | Age: 59
LOS: 0 days | Discharge: HOME | DRG: 830 | End: 2019-04-19
Attending: OBSTETRICS & GYNECOLOGY | Admitting: OBSTETRICS & GYNECOLOGY
Payer: COMMERCIAL

## 2019-04-18 ENCOUNTER — RESULT REVIEW (OUTPATIENT)
Age: 59
End: 2019-04-18

## 2019-04-18 ENCOUNTER — APPOINTMENT (OUTPATIENT)
Dept: GYNECOLOGIC ONCOLOGY | Facility: HOSPITAL | Age: 59
End: 2019-04-18

## 2019-04-18 VITALS
TEMPERATURE: 98 F | RESPIRATION RATE: 16 BRPM | WEIGHT: 172.4 LBS | HEIGHT: 63 IN | OXYGEN SATURATION: 100 % | DIASTOLIC BLOOD PRESSURE: 63 MMHG | SYSTOLIC BLOOD PRESSURE: 128 MMHG | HEART RATE: 70 BPM

## 2019-04-18 DIAGNOSIS — Z87.442 PERSONAL HISTORY OF URINARY CALCULI: ICD-10-CM

## 2019-04-18 DIAGNOSIS — Z98.890 OTHER SPECIFIED POSTPROCEDURAL STATES: Chronic | ICD-10-CM

## 2019-04-18 DIAGNOSIS — E11.22 TYPE 2 DIABETES MELLITUS WITH DIABETIC CHRONIC KIDNEY DISEASE: ICD-10-CM

## 2019-04-18 DIAGNOSIS — N73.6 FEMALE PELVIC PERITONEAL ADHESIONS (POSTINFECTIVE): ICD-10-CM

## 2019-04-18 DIAGNOSIS — E89.0 POSTPROCEDURAL HYPOTHYROIDISM: ICD-10-CM

## 2019-04-18 DIAGNOSIS — Z90.49 ACQUIRED ABSENCE OF OTHER SPECIFIED PARTS OF DIGESTIVE TRACT: ICD-10-CM

## 2019-04-18 DIAGNOSIS — Z87.442 PERSONAL HISTORY OF URINARY CALCULI: Chronic | ICD-10-CM

## 2019-04-18 DIAGNOSIS — N85.2 HYPERTROPHY OF UTERUS: ICD-10-CM

## 2019-04-18 DIAGNOSIS — D25.9 LEIOMYOMA OF UTERUS, UNSPECIFIED: ICD-10-CM

## 2019-04-18 DIAGNOSIS — C7A.098 MALIGNANT CARCINOID TUMORS OF OTHER SITES: ICD-10-CM

## 2019-04-18 DIAGNOSIS — Z98.891 HISTORY OF UTERINE SCAR FROM PREVIOUS SURGERY: Chronic | ICD-10-CM

## 2019-04-18 DIAGNOSIS — Z90.2 ACQUIRED ABSENCE OF LUNG [PART OF]: Chronic | ICD-10-CM

## 2019-04-18 DIAGNOSIS — Z41.9 ENCOUNTER FOR PROCEDURE FOR PURPOSES OTHER THAN REMEDYING HEALTH STATE, UNSPECIFIED: Chronic | ICD-10-CM

## 2019-04-18 DIAGNOSIS — I12.9 HYPERTENSIVE CHRONIC KIDNEY DISEASE WITH STAGE 1 THROUGH STAGE 4 CHRONIC KIDNEY DISEASE, OR UNSPECIFIED CHRONIC KIDNEY DISEASE: ICD-10-CM

## 2019-04-18 DIAGNOSIS — C7B.09 SECONDARY CARCINOID TUMORS OF OTHER SITES: ICD-10-CM

## 2019-04-18 DIAGNOSIS — N18.9 CHRONIC KIDNEY DISEASE, UNSPECIFIED: ICD-10-CM

## 2019-04-18 DIAGNOSIS — Z90.2 ACQUIRED ABSENCE OF LUNG [PART OF]: ICD-10-CM

## 2019-04-18 DIAGNOSIS — Z90.49 ACQUIRED ABSENCE OF OTHER SPECIFIED PARTS OF DIGESTIVE TRACT: Chronic | ICD-10-CM

## 2019-04-18 LAB
GLUCOSE BLDC GLUCOMTR-MCNC: 116 MG/DL — HIGH (ref 70–99)
GLUCOSE BLDC GLUCOMTR-MCNC: 163 MG/DL — HIGH (ref 70–99)

## 2019-04-18 PROCEDURE — 38570 LAPAROSCOPY LYMPH NODE BIOP: CPT | Mod: 22

## 2019-04-18 PROCEDURE — 38900 IO MAP OF SENT LYMPH NODE: CPT | Mod: 22

## 2019-04-18 PROCEDURE — 58573 TLH W/T/O UTERUS OVER 250 G: CPT | Mod: 22

## 2019-04-18 RX ORDER — ACETAMINOPHEN 500 MG
1000 TABLET ORAL EVERY 8 HOURS
Qty: 0 | Refills: 0 | Status: DISCONTINUED | OUTPATIENT
Start: 2019-04-18 | End: 2019-04-19

## 2019-04-18 RX ORDER — ONDANSETRON 8 MG/1
4 TABLET, FILM COATED ORAL EVERY 6 HOURS
Qty: 0 | Refills: 0 | Status: DISCONTINUED | OUTPATIENT
Start: 2019-04-18 | End: 2019-04-19

## 2019-04-18 RX ORDER — OXYCODONE AND ACETAMINOPHEN 5; 325 MG/1; MG/1
1 TABLET ORAL ONCE
Qty: 0 | Refills: 0 | Status: DISCONTINUED | OUTPATIENT
Start: 2019-04-18 | End: 2019-04-18

## 2019-04-18 RX ORDER — ONDANSETRON 8 MG/1
4 TABLET, FILM COATED ORAL ONCE
Qty: 0 | Refills: 0 | Status: COMPLETED | OUTPATIENT
Start: 2019-04-18 | End: 2019-04-18

## 2019-04-18 RX ORDER — OXYCODONE HYDROCHLORIDE 5 MG/1
5 TABLET ORAL EVERY 6 HOURS
Qty: 0 | Refills: 0 | Status: DISCONTINUED | OUTPATIENT
Start: 2019-04-18 | End: 2019-04-19

## 2019-04-18 RX ORDER — ACETAMINOPHEN 500 MG
1000 TABLET ORAL ONCE
Qty: 0 | Refills: 0 | Status: COMPLETED | OUTPATIENT
Start: 2019-04-18 | End: 2019-04-18

## 2019-04-18 RX ORDER — KETOROLAC TROMETHAMINE 30 MG/ML
30 SYRINGE (ML) INJECTION EVERY 8 HOURS
Qty: 0 | Refills: 0 | Status: DISCONTINUED | OUTPATIENT
Start: 2019-04-18 | End: 2019-04-18

## 2019-04-18 RX ORDER — HEPARIN SODIUM 5000 [USP'U]/ML
5000 INJECTION INTRAVENOUS; SUBCUTANEOUS ONCE
Qty: 0 | Refills: 0 | Status: COMPLETED | OUTPATIENT
Start: 2019-04-18 | End: 2019-04-18

## 2019-04-18 RX ORDER — GABAPENTIN 400 MG/1
600 CAPSULE ORAL ONCE
Qty: 0 | Refills: 0 | Status: COMPLETED | OUTPATIENT
Start: 2019-04-18 | End: 2019-04-18

## 2019-04-18 RX ORDER — SODIUM CHLORIDE 9 MG/ML
1000 INJECTION, SOLUTION INTRAVENOUS
Qty: 0 | Refills: 0 | Status: DISCONTINUED | OUTPATIENT
Start: 2019-04-18 | End: 2019-04-19

## 2019-04-18 RX ORDER — METOCLOPRAMIDE HCL 10 MG
10 TABLET ORAL EVERY 6 HOURS
Qty: 0 | Refills: 0 | Status: DISCONTINUED | OUTPATIENT
Start: 2019-04-18 | End: 2019-04-18

## 2019-04-18 RX ORDER — HYDROMORPHONE HYDROCHLORIDE 2 MG/ML
0.2 INJECTION INTRAMUSCULAR; INTRAVENOUS; SUBCUTANEOUS EVERY 4 HOURS
Qty: 0 | Refills: 0 | Status: DISCONTINUED | OUTPATIENT
Start: 2019-04-18 | End: 2019-04-19

## 2019-04-18 RX ORDER — OXYCODONE HYDROCHLORIDE 5 MG/1
10 TABLET ORAL EVERY 6 HOURS
Qty: 0 | Refills: 0 | Status: DISCONTINUED | OUTPATIENT
Start: 2019-04-18 | End: 2019-04-19

## 2019-04-18 RX ORDER — BUPIVACAINE 13.3 MG/ML
20 INJECTION, SUSPENSION, LIPOSOMAL INFILTRATION ONCE
Qty: 0 | Refills: 0 | Status: DISCONTINUED | OUTPATIENT
Start: 2019-04-18 | End: 2019-04-19

## 2019-04-18 RX ORDER — METOCLOPRAMIDE HCL 10 MG
10 TABLET ORAL EVERY 6 HOURS
Qty: 0 | Refills: 0 | Status: DISCONTINUED | OUTPATIENT
Start: 2019-04-18 | End: 2019-04-19

## 2019-04-18 RX ORDER — ENOXAPARIN SODIUM 100 MG/ML
40 INJECTION SUBCUTANEOUS DAILY
Qty: 0 | Refills: 0 | Status: DISCONTINUED | OUTPATIENT
Start: 2019-04-19 | End: 2019-04-19

## 2019-04-18 RX ORDER — ASPIRIN/CALCIUM CARB/MAGNESIUM 324 MG
1 TABLET ORAL
Qty: 0 | Refills: 0 | COMMUNITY

## 2019-04-18 RX ORDER — HYDROMORPHONE HYDROCHLORIDE 2 MG/ML
0.5 INJECTION INTRAMUSCULAR; INTRAVENOUS; SUBCUTANEOUS
Qty: 0 | Refills: 0 | Status: DISCONTINUED | OUTPATIENT
Start: 2019-04-18 | End: 2019-04-18

## 2019-04-18 RX ORDER — CELECOXIB 200 MG/1
400 CAPSULE ORAL ONCE
Qty: 0 | Refills: 0 | Status: COMPLETED | OUTPATIENT
Start: 2019-04-18 | End: 2019-04-18

## 2019-04-18 RX ADMIN — GABAPENTIN 600 MILLIGRAM(S): 400 CAPSULE ORAL at 12:42

## 2019-04-18 RX ADMIN — Medication 10 MILLIGRAM(S): at 22:30

## 2019-04-18 RX ADMIN — CELECOXIB 400 MILLIGRAM(S): 200 CAPSULE ORAL at 12:41

## 2019-04-18 RX ADMIN — HEPARIN SODIUM 5000 UNIT(S): 5000 INJECTION INTRAVENOUS; SUBCUTANEOUS at 12:41

## 2019-04-18 RX ADMIN — HYDROMORPHONE HYDROCHLORIDE 0.5 MILLIGRAM(S): 2 INJECTION INTRAMUSCULAR; INTRAVENOUS; SUBCUTANEOUS at 22:06

## 2019-04-18 RX ADMIN — HYDROMORPHONE HYDROCHLORIDE 0.5 MILLIGRAM(S): 2 INJECTION INTRAMUSCULAR; INTRAVENOUS; SUBCUTANEOUS at 22:03

## 2019-04-18 RX ADMIN — HYDROMORPHONE HYDROCHLORIDE 0.5 MILLIGRAM(S): 2 INJECTION INTRAMUSCULAR; INTRAVENOUS; SUBCUTANEOUS at 20:57

## 2019-04-18 RX ADMIN — HYDROMORPHONE HYDROCHLORIDE 0.5 MILLIGRAM(S): 2 INJECTION INTRAMUSCULAR; INTRAVENOUS; SUBCUTANEOUS at 22:45

## 2019-04-18 RX ADMIN — Medication 1000 MILLIGRAM(S): at 12:41

## 2019-04-18 RX ADMIN — ONDANSETRON 4 MILLIGRAM(S): 8 TABLET, FILM COATED ORAL at 20:35

## 2019-04-18 NOTE — BRIEF OPERATIVE NOTE - NSICDXBRIEFPROCEDURE_GEN_ALL_CORE_FT
PROCEDURES:  Cystoscopy 18-Apr-2019 19:28:32  Ella Gonzalez  Lysis of pelvic adhesions 18-Apr-2019 19:28:22  Ella Gonzalez  [place] node mapping with biopsy 18-Apr-2019 19:28:16  Ella Gonzalez  Bilateral salpingoophorectomy 18-Apr-2019 19:27:32  Ella Gonzalez  Hysterectomy, total, for uterus greater than 250 grams, laparoscopic 18-Apr-2019 19:27:17  Ella Gonzalez

## 2019-04-18 NOTE — PACU DISCHARGE NOTE - COMMENTS
Patient is s/p Cystoscopy 18-Apr-2019   Lysis of pelvic adhesions 18-Apr-2019  [place] node mapping with biopsy 18-Apr-2019   Bilateral salpingoophorectomy 18-Apr-2019   Hysterectomy, total, for uterus greater than 250 grams, laparoscopic 18-Apr-2019    Patient is hemodynamically stable and meets PACU discharge criteria.  Report given to unit RN.  Patient transported via bed to unit, accompanied by transport team.

## 2019-04-18 NOTE — BRIEF OPERATIVE NOTE - OPERATION/FINDINGS
15cm fibroid uterus, bilateral hydrosalpinx, normal appearing ovaries bilaterally; severe omental adhesive disease in the midline; specimen removed via umbilical port site extension; right sided sentinel lymph node did not map

## 2019-04-18 NOTE — PROGRESS NOTE ADULT - ASSESSMENT
58y Female POD# 0 s/p TLH, BSO, [place], sentinel node biopsy with cystoscopy. Patient clinically stable but with persistent nausea and drowsiness after anesthesia.                                       1. Neuro/Pain:  Acetaminophen 1000 mg q8 ATC, Oxycodone 5mg or 10mg PRN, Hydromorphone 0.2mg IVP for breakthrough; holding NSAIDS given patient's Cr   2  CV:   VS per routine  3. Pulm: Encourage ISS  4. GI: Advance diet as tolerated, LR at 100 cc per hour   5. : s/p Pereira - due to vod by 1:30 am.   6. Heme: AM CBC  7. ID: --  8. DVT ppx: SCDs, Lovenox 40mg Qd  9. Dispo: Likely POD#1 58y Female POD# 0 s/p TLH, BSO, [place], sentinel node biopsy with cystoscopy. Patient clinically stable but with persistent nausea and drowsiness after anesthesia. Given patient's failure to meet PACU milestones, will admit for ongoing observation overnight.                                   1. Neuro/Pain:  Acetaminophen 1000 mg q8 ATC, Oxycodone 5mg or 10mg PRN, Hydromorphone 0.2mg IVP for breakthrough; holding NSAIDS given patient's Cr   2  CV:   VS per routine  3. Pulm: Encourage ISS  4. GI: Advance diet as tolerated, LR at 100 cc per hour   5. : s/p Pereira - due to vod by 1:30 am.   6. Heme: AM CBC  7. ID: --  8. DVT ppx: SCDs, Lovenox 40mg Qd  9. Dispo: Likely POD#1

## 2019-04-19 ENCOUNTER — TRANSCRIPTION ENCOUNTER (OUTPATIENT)
Age: 59
End: 2019-04-19

## 2019-04-19 VITALS
HEART RATE: 84 BPM | DIASTOLIC BLOOD PRESSURE: 52 MMHG | RESPIRATION RATE: 16 BRPM | SYSTOLIC BLOOD PRESSURE: 110 MMHG | TEMPERATURE: 98 F | OXYGEN SATURATION: 98 %

## 2019-04-19 LAB
ANION GAP SERPL CALC-SCNC: 12 MMOL/L — SIGNIFICANT CHANGE UP (ref 5–17)
BASOPHILS # BLD AUTO: 0.01 K/UL — SIGNIFICANT CHANGE UP (ref 0–0.2)
BASOPHILS NFR BLD AUTO: 0.1 % — SIGNIFICANT CHANGE UP (ref 0–2)
BUN SERPL-MCNC: 24 MG/DL — HIGH (ref 7–23)
CALCIUM SERPL-MCNC: 8.3 MG/DL — LOW (ref 8.4–10.5)
CHLORIDE SERPL-SCNC: 108 MMOL/L — SIGNIFICANT CHANGE UP (ref 96–108)
CO2 SERPL-SCNC: 17 MMOL/L — LOW (ref 22–31)
CREAT SERPL-MCNC: 2.11 MG/DL — HIGH (ref 0.5–1.3)
EOSINOPHIL # BLD AUTO: 0 K/UL — SIGNIFICANT CHANGE UP (ref 0–0.5)
EOSINOPHIL NFR BLD AUTO: 0 % — SIGNIFICANT CHANGE UP (ref 0–6)
GLUCOSE BLDC GLUCOMTR-MCNC: 162 MG/DL — HIGH (ref 70–99)
GLUCOSE SERPL-MCNC: 166 MG/DL — HIGH (ref 70–99)
HCT VFR BLD CALC: 31.4 % — LOW (ref 34.5–45)
HGB BLD-MCNC: 9.9 G/DL — LOW (ref 11.5–15.5)
IMM GRANULOCYTES NFR BLD AUTO: 1 % — SIGNIFICANT CHANGE UP (ref 0–1.5)
LYMPHOCYTES # BLD AUTO: 0.5 K/UL — LOW (ref 1–3.3)
LYMPHOCYTES # BLD AUTO: 3.7 % — LOW (ref 13–44)
MAGNESIUM SERPL-MCNC: 1.9 MG/DL — SIGNIFICANT CHANGE UP (ref 1.6–2.6)
MCHC RBC-ENTMCNC: 29.1 PG — SIGNIFICANT CHANGE UP (ref 27–34)
MCHC RBC-ENTMCNC: 31.5 GM/DL — LOW (ref 32–36)
MCV RBC AUTO: 92.4 FL — SIGNIFICANT CHANGE UP (ref 80–100)
MONOCYTES # BLD AUTO: 0.82 K/UL — SIGNIFICANT CHANGE UP (ref 0–0.9)
MONOCYTES NFR BLD AUTO: 6.1 % — SIGNIFICANT CHANGE UP (ref 2–14)
NEUTROPHILS # BLD AUTO: 11.89 K/UL — HIGH (ref 1.8–7.4)
NEUTROPHILS NFR BLD AUTO: 89.1 % — HIGH (ref 43–77)
NRBC # BLD: 0 /100 WBCS — SIGNIFICANT CHANGE UP (ref 0–0)
PHOSPHATE SERPL-MCNC: 4.4 MG/DL — SIGNIFICANT CHANGE UP (ref 2.5–4.5)
PLATELET # BLD AUTO: 186 K/UL — SIGNIFICANT CHANGE UP (ref 150–400)
POTASSIUM SERPL-MCNC: 3.6 MMOL/L — SIGNIFICANT CHANGE UP (ref 3.5–5.3)
POTASSIUM SERPL-SCNC: 3.6 MMOL/L — SIGNIFICANT CHANGE UP (ref 3.5–5.3)
RBC # BLD: 3.4 M/UL — LOW (ref 3.8–5.2)
RBC # FLD: 15.2 % — HIGH (ref 10.3–14.5)
SODIUM SERPL-SCNC: 137 MMOL/L — SIGNIFICANT CHANGE UP (ref 135–145)
WBC # BLD: 13.36 K/UL — HIGH (ref 3.8–10.5)
WBC # FLD AUTO: 13.36 K/UL — HIGH (ref 3.8–10.5)

## 2019-04-19 PROCEDURE — 88305 TISSUE EXAM BY PATHOLOGIST: CPT

## 2019-04-19 PROCEDURE — 88331 PATH CONSLTJ SURG 1 BLK 1SPC: CPT

## 2019-04-19 PROCEDURE — 88307 TISSUE EXAM BY PATHOLOGIST: CPT

## 2019-04-19 PROCEDURE — 88341 IMHCHEM/IMCYTCHM EA ADD ANTB: CPT

## 2019-04-19 PROCEDURE — 86900 BLOOD TYPING SEROLOGIC ABO: CPT

## 2019-04-19 PROCEDURE — 86850 RBC ANTIBODY SCREEN: CPT

## 2019-04-19 PROCEDURE — 83735 ASSAY OF MAGNESIUM: CPT

## 2019-04-19 PROCEDURE — 85025 COMPLETE CBC W/AUTO DIFF WBC: CPT

## 2019-04-19 PROCEDURE — 86901 BLOOD TYPING SEROLOGIC RH(D): CPT

## 2019-04-19 PROCEDURE — 88360 TUMOR IMMUNOHISTOCHEM/MANUAL: CPT

## 2019-04-19 PROCEDURE — 84100 ASSAY OF PHOSPHORUS: CPT

## 2019-04-19 PROCEDURE — 36415 COLL VENOUS BLD VENIPUNCTURE: CPT

## 2019-04-19 PROCEDURE — 82962 GLUCOSE BLOOD TEST: CPT

## 2019-04-19 PROCEDURE — 80048 BASIC METABOLIC PNL TOTAL CA: CPT

## 2019-04-19 RX ORDER — LEVOTHYROXINE SODIUM 125 MCG
50 TABLET ORAL DAILY
Qty: 0 | Refills: 0 | Status: DISCONTINUED | OUTPATIENT
Start: 2019-04-19 | End: 2019-04-19

## 2019-04-19 RX ORDER — PANTOPRAZOLE SODIUM 20 MG/1
40 TABLET, DELAYED RELEASE ORAL
Qty: 0 | Refills: 0 | Status: DISCONTINUED | OUTPATIENT
Start: 2019-04-19 | End: 2019-04-19

## 2019-04-19 RX ORDER — ASPIRIN/CALCIUM CARB/MAGNESIUM 324 MG
1 TABLET ORAL
Qty: 0 | Refills: 0 | COMMUNITY

## 2019-04-19 RX ORDER — LEVOTHYROXINE SODIUM 125 MCG
1 TABLET ORAL
Qty: 0 | Refills: 0 | COMMUNITY

## 2019-04-19 RX ORDER — METOPROLOL TARTRATE 50 MG
1 TABLET ORAL
Qty: 0 | Refills: 0 | COMMUNITY

## 2019-04-19 RX ORDER — SIMVASTATIN 20 MG/1
1 TABLET, FILM COATED ORAL
Qty: 0 | Refills: 0 | COMMUNITY

## 2019-04-19 RX ORDER — SIMVASTATIN 20 MG/1
20 TABLET, FILM COATED ORAL AT BEDTIME
Qty: 0 | Refills: 0 | Status: DISCONTINUED | OUTPATIENT
Start: 2019-04-19 | End: 2019-04-19

## 2019-04-19 RX ORDER — SODIUM CHLORIDE 9 MG/ML
3 INJECTION INTRAMUSCULAR; INTRAVENOUS; SUBCUTANEOUS EVERY 8 HOURS
Qty: 0 | Refills: 0 | Status: DISCONTINUED | OUTPATIENT
Start: 2019-04-19 | End: 2019-04-19

## 2019-04-19 RX ORDER — METOPROLOL TARTRATE 50 MG
25 TABLET ORAL DAILY
Qty: 0 | Refills: 0 | Status: DISCONTINUED | OUTPATIENT
Start: 2019-04-19 | End: 2019-04-19

## 2019-04-19 RX ADMIN — ENOXAPARIN SODIUM 40 MILLIGRAM(S): 100 INJECTION SUBCUTANEOUS at 13:47

## 2019-04-19 RX ADMIN — PANTOPRAZOLE SODIUM 40 MILLIGRAM(S): 20 TABLET, DELAYED RELEASE ORAL at 06:01

## 2019-04-19 RX ADMIN — Medication 1000 MILLIGRAM(S): at 14:30

## 2019-04-19 RX ADMIN — Medication 1000 MILLIGRAM(S): at 06:00

## 2019-04-19 RX ADMIN — OXYCODONE HYDROCHLORIDE 10 MILLIGRAM(S): 5 TABLET ORAL at 11:30

## 2019-04-19 RX ADMIN — Medication 50 MICROGRAM(S): at 05:13

## 2019-04-19 RX ADMIN — Medication 1000 MILLIGRAM(S): at 07:00

## 2019-04-19 NOTE — H&P ADULT - NSICDXPASTSURGICALHX_GEN_ALL_CORE_FT
PAST SURGICAL HISTORY:  Elective surgery open heart surgery for tumor removal    Elective surgery partial thyroidectomy    H/O laparoscopy     History of  X2    History of cholecystectomy     History of kidney stones     History of lobectomy of lung left

## 2019-04-19 NOTE — PROGRESS NOTE ADULT - ASSESSMENT
POD1 [facility] BSO for CAH   - meeting milestones  - dc home today  - f/u 1 week  - tylenol at home for pain control

## 2019-04-19 NOTE — DISCHARGE NOTE PROVIDER - CARE PROVIDER_API CALL
Kaitlin Montgomery)  Obstetrics and Gynecology  [address] [place], [place] [address]  Phone: [phone]  Fax: [phone]  Follow Up Time:

## 2019-04-19 NOTE — DISCHARGE NOTE PROVIDER - NSDCCPCAREPLAN_GEN_ALL_CORE_FT
PRINCIPAL DISCHARGE DIAGNOSIS  Diagnosis: Complex atypical endometrial hyperplasia  Assessment and Plan of Treatment:

## 2019-04-19 NOTE — H&P ADULT - HISTORY OF PRESENT ILLNESS
57yo F with PMB in July and a subsequent diagnosis of CAH (diagnosed on ) presented for scheduled TLH BSO sentinal lymph node biopsy. The bleeding was while she was on 5 fu chemotherapy for carcinoid. She was diagnosed in the 80s with removal of tumors from lung, heart and liver (most recently). chemo for 9 months completed in 2018.      GYN: CAH  OB: C/S X 2 (1 child  after delivery)  PMH: CKD, hypothyroidism, HTN, DM  MEDS: synthroid, toprol, simvastatin, prilosec, sumatolin  ALL: PCN (unknown childhood rxn)

## 2019-04-19 NOTE — DISCHARGE NOTE PROVIDER - NSDCACTIVITY_GEN_ALL_CORE
Stairs allowed/Showering allowed/Do not drive or operate machinery/Walking - Indoors allowed/Walking - Outdoors allowed/No heavy lifting/straining/Do not make important decisions

## 2019-04-19 NOTE — DISCHARGE NOTE PROVIDER - HOSPITAL COURSE
admitted overnight for observtion and nausea control after surgery. Felt better in AM. No nausea or pain. Meeting milestones. Uneventful course.

## 2019-04-19 NOTE — H&P ADULT - NSICDXPASTMEDICALHX_GEN_ALL_CORE_FT
PAST MEDICAL HISTORY:  Carcinoid tumor     CKD (chronic kidney disease)     DM (diabetes mellitus)     HTN (hypertension)

## 2019-04-19 NOTE — PROGRESS NOTE ADULT - SUBJECTIVE AND OBJECTIVE BOX
GYN Progress Note    Patient seen at bedside.  Pain controlled.  Tolerating reg.  Voiding  -flatus  no n/v.  feeling much better this AM.    Vital Signs Last 24 Hrs  T(C): 36.7 (19 Apr 2019 08:28), Max: 36.7 (19 Apr 2019 08:28)  T(F): 98 (19 Apr 2019 08:28), Max: 98 (19 Apr 2019 08:28)  HR: 84 (19 Apr 2019 08:28) (70 - 98)  BP: 110/52 (19 Apr 2019 08:28) (107/55 - 153/73)  BP(mean): 87 (18 Apr 2019 22:38) (87 - 102)  RR: 16 (19 Apr 2019 08:28) (15 - 23)  SpO2: 98% (19 Apr 2019 08:28) (96% - 100%)    Physical Exam:  Gen: No Acute Distress  Pulm: no respiratory distress  ABD: soft, appropriately nontender, nondistended, +BS, no rebound, no guarding.  Incision C/D/I  Ext: SCDs in place, wnl    I&O's Summary    18 Apr 2019 07:01  -  19 Apr 2019 07:00  --------------------------------------------------------  IN: 900 mL / OUT: 600 mL / NET: 300 mL      MEDICATIONS  (STANDING):  acetaminophen   Tablet .. 1000 milliGRAM(s) Oral every 8 hours  BUpivacaine liposome 1.3% Injectable (no eMAR) 20 milliLiter(s) Local Injection Once  enoxaparin Injectable 40 milliGRAM(s) SubCutaneous daily  levothyroxine 50 MICROGram(s) Oral daily  metoprolol succinate ER 25 milliGRAM(s) Oral daily  ondansetron   Disintegrating Tablet 4 milliGRAM(s) Oral every 6 hours  pantoprazole    Tablet 40 milliGRAM(s) Oral before breakfast  simvastatin 20 milliGRAM(s) Oral at bedtime  sodium chloride 0.9% lock flush 3 milliLiter(s) IV Push every 8 hours    MEDICATIONS  (PRN):  HYDROmorphone  Injectable 0.2 milliGRAM(s) IV Push every 4 hours PRN Breathrough severe pain  metoclopramide Injectable 10 milliGRAM(s) IV Push every 6 hours PRN Nausea and vomiting not relieved by Zofran  oxyCODONE    IR 5 milliGRAM(s) Oral every 6 hours PRN Moderate Pain (4 - 6)  oxyCODONE    IR 10 milliGRAM(s) Oral every 6 hours PRN Severe Pain (7 - 10)      LABS:                        9.9    13.36 )-----------( 186      ( 19 Apr 2019 07:45 )             31.4     04-19    137  |  108  |  24<H>  ----------------------------<  166<H>  3.6   |  17<L>  |  2.11<H>    Ca    8.3<L>      19 Apr 2019 07:45  Phos  4.4     04-19  Mg     1.9     04-19

## 2019-04-19 NOTE — DISCHARGE NOTE NURSING/CASE MANAGEMENT/SOCIAL WORK - NSDCDPATPORTLINK_GEN_ALL_CORE
You can access the [employer] [facility], offered by [facility], by registering with the following website: [email or web address]

## 2019-04-19 NOTE — DISCHARGE NOTE PROVIDER - NSDCFUADDINST_GEN_ALL_CORE_FT
showering is allowed, no bath  nothing in the vagina for 8 weeks  no heavy lifting  return to the hospital if you have fever >101, severe abdominal pain or excessive vaginal bleeding  Take Tylenol 1000mg every 8 hours for pain control showering is allowed, no bath  nothing in the vagina for 8 weeks  no heavy lifting  return to the hospital if you have fever >101, severe abdominal pain or excessive vaginal bleeding  Take Tylenol 1000mg every 8 hours for pain control  Please continue to take all home meds you were on before including Simvastatin, Aspirin, Prilosec, Metoprolol and Synthroid)

## 2019-04-26 ENCOUNTER — APPOINTMENT (OUTPATIENT)
Dept: GYNECOLOGIC ONCOLOGY | Facility: CLINIC | Age: 59
End: 2019-04-26
Payer: COMMERCIAL

## 2019-04-26 VITALS
OXYGEN SATURATION: 100 % | HEIGHT: 64 IN | DIASTOLIC BLOOD PRESSURE: 85 MMHG | SYSTOLIC BLOOD PRESSURE: 129 MMHG | WEIGHT: 172 LBS | HEART RATE: 91 BPM | BODY MASS INDEX: 29.37 KG/M2

## 2019-04-26 DIAGNOSIS — B37.3 CANDIDIASIS OF VULVA AND VAGINA: ICD-10-CM

## 2019-04-26 DIAGNOSIS — N85.02 ENDOMETRIAL INTRAEPITHELIAL NEOPLASIA [EIN]: ICD-10-CM

## 2019-04-26 LAB — SURGICAL PATHOLOGY STUDY: SIGNIFICANT CHANGE UP

## 2019-04-26 PROCEDURE — 99024 POSTOP FOLLOW-UP VISIT: CPT

## 2019-04-26 NOTE — PHYSICAL EXAM
[Normal] : No focal neurologic defects observed [Absent] : Adnexa(ae): Absent [de-identified] : vertical and pfannensteil scars from prior c-sections, healing laparoscopic incisions  [de-identified] : distribution of vulvar rash c/w vulvar candida.  [de-identified] : s/p hysterectomy  [de-identified] : s/p bso

## 2019-04-26 NOTE — DISCUSSION/SUMMARY
[Pre Op] : The differential diagnosis was discussed in detail. The indications, risks, benefits and alternatives were discussed. [unfilled] expressed an understanding of the treatment rationale and her questions were answered to her apparent satisfaction. [FreeTextEntry1] : Excellent 1 week post-op  [FreeTextEntry2] : CAH

## 2019-04-26 NOTE — REASON FOR VISIT
[FreeTextEntry1] : Post-op. Patient here today for her 1 week post-op. Feeling well. Patient complaining of pain at the vulva. \par \par GynHx: CAH\par ObHx:  Section x 2 (one child passed away after delivery)\par PmHx: CKD, hypothyroid, HTN, high cholesterol, metastatic carcinoid tumor\par SurgHx: pneumonectomy, C/S\par Meds: Synthroid, Toprol, Simvastatin, Asprin, Prilosec, Sumatolin\par All: PCN\par \par

## 2019-04-26 NOTE — ASSESSMENT
[FreeTextEntry1] : Excellent 1 week post-op recovery. \par \par Will Rx clotrimazole for rash. Patient instructed to call if no improvement.\par \par Otherwise f/u in 4 weeks\par \par

## 2019-04-26 NOTE — HISTORY OF PRESENT ILLNESS
[FreeTextEntry1] : Problem\par 1)Complex atypical hyperplasia \par 2)Endometrial polyp\par \par Previous Therapy\par 1)Pap 2/18/19\par    a)NILM, HPV Negative \par 2)EMB 2/27/19\par    a)Focal complex atypical endometrial hyperplasia, disordered proliferative endometrium with metaplasia features suggestive of endometrial polyp \par 3)CTAP 4/5/19\par    a)Unchanged fibroid uterus \par 4)Total laparoscopic hysterectomy bilateral salpingo oophorectomy 4/18/19\par    a)Path pending

## 2019-05-28 ENCOUNTER — APPOINTMENT (OUTPATIENT)
Dept: NEPHROLOGY | Facility: CLINIC | Age: 59
End: 2019-05-28
Payer: COMMERCIAL

## 2019-05-28 ENCOUNTER — LABORATORY RESULT (OUTPATIENT)
Age: 59
End: 2019-05-28

## 2019-05-28 VITALS — SYSTOLIC BLOOD PRESSURE: 106 MMHG | HEART RATE: 72 BPM | DIASTOLIC BLOOD PRESSURE: 63 MMHG

## 2019-05-28 VITALS — HEIGHT: 63 IN | WEIGHT: 169.25 LBS | BODY MASS INDEX: 29.99 KG/M2

## 2019-05-28 PROCEDURE — 99214 OFFICE O/P EST MOD 30 MIN: CPT | Mod: 25

## 2019-05-28 PROCEDURE — 36415 COLL VENOUS BLD VENIPUNCTURE: CPT

## 2019-05-28 RX ORDER — IBUPROFEN 800 MG/1
800 TABLET, FILM COATED ORAL 3 TIMES DAILY
Qty: 45 | Refills: 0 | Status: DISCONTINUED | COMMUNITY
Start: 2019-04-17 | End: 2019-05-28

## 2019-05-28 NOTE — PHYSICAL EXAM
[General Appearance - Alert] : alert [General Appearance - In No Acute Distress] : in no acute distress [Sclera] : the sclera and conjunctiva were normal [PERRL With Normal Accommodation] : pupils were equal in size, round, and reactive to light [Extraocular Movements] : extraocular movements were intact [Outer Ear] : the ears and nose were normal in appearance [Oropharynx] : the oropharynx was normal [Neck Appearance] : the appearance of the neck was normal [Thyroid Diffuse Enlargement] : the thyroid was not enlarged [Jugular Venous Distention Increased] : there was no jugular-venous distention [Thyroid Nodule] : there were no palpable thyroid nodules [Neck Cervical Mass (___cm)] : no neck mass was observed [Auscultation Breath Sounds / Voice Sounds] : lungs were clear to auscultation bilaterally [Heart Rate And Rhythm] : heart rate was normal and rhythm regular [Heart Sounds Gallop] : no gallops [Heart Sounds] : normal S1 and S2 [Murmurs] : no murmurs [Edema] : there was no peripheral edema [Heart Sounds Pericardial Friction Rub] : no pericardial rub [Veins - Varicosity Changes] : there were no varicosital changes [Bowel Sounds] : normal bowel sounds [Abdomen Tenderness] : non-tender [Abdomen Soft] : soft [Cervical Lymph Nodes Enlarged Posterior Bilaterally] : posterior cervical [Abdomen Mass (___ Cm)] : no abdominal mass palpated [Abnormal Walk] : normal gait [Cervical Lymph Nodes Enlarged Anterior Bilaterally] : anterior cervical [Supraclavicular Lymph Nodes Enlarged Bilaterally] : supraclavicular [Nail Clubbing] : no clubbing  or cyanosis of the fingernails [Musculoskeletal - Swelling] : no joint swelling seen [Motor Tone] : muscle strength and tone were normal [Skin Color & Pigmentation] : normal skin color and pigmentation [Skin Turgor] : normal skin turgor [] : no rash [Oriented To Time, Place, And Person] : oriented to person, place, and time [Impaired Insight] : insight and judgment were intact [Affect] : the affect was normal

## 2019-05-28 NOTE — HISTORY OF PRESENT ILLNESS
[FreeTextEntry1] : Kindly referred by Dr. Manuel Areavlo for decrease kidney function. \par \par * S/P hysterectomy and bilateral GAURAV. 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 * CKD previous stable, creatinine 2.21. * K decreased to 2.9. Repeat K was improved in ED. She has increased in potassium in her diet. * Oral bicarb started for acidosis. (1 tsp)\par \par Previous history (26Mar19): * Progressive CKD. Creatinine 2.18. * HCO3 15 previously. Baking soda 650 bid started.  * HTN controlled. No lightheadedness. Compliant with medications. Following low salt diet. * Off metformin. * 1.6 g/g proteinuria. \par \par Previous history (20Feb19): * HTN controlled, 120s - 130s at home. No lightheadedness. Compliant with medications. Following low salt diet. * Proteinuria 1.6 g/g. *  Metformin was stopped last visit. Nausea resolved.* CKD stable, creatinine 2.1. * HCO3 15. She has not started baking soda as instructed. 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 She has never had flushing or carcinoid syndrome. Since 2013, she has been treated with monthly sandostatin and in 2014 was treated with everolimus (afinitor). In 2017, she was treated with 5FU (Nov 17 - July 18) and another type of chemotherapy, though she is not on this currently. She was diagnosed with diabetes in 2017 and started on metformin, which she is still taking. In 2017 she was also started on lisinopril by her cardiologist "to protect her kidneys". She first became aware of decreased kidney function in about November of 2018. She is uncertain what her creatinine is but believes her kidney function is 17 percent. (See below.) Labs have been requested and are currently pending. A renal ultrasound on 12/3/18 revealed 9.5/9.8 kidneys which were echogenic. There was no hydronephrosis. The patient denies exposure to chronic NSAIDs, phosphate bowel preparations, PPIs, green smoothies, creatine, or herbal supplements.\par \par One episode of kidney stone which passed spontaneously in 2004. \par \par ADDENDUM: Creatinine 2.07, GFR 25 on labs from 1/7/19. Per discussion with Dr. Arevalo, her creatinine has increased in May 2018. It has been stable since November 2018. \par \par \par

## 2019-05-28 NOTE — ASSESSMENT
[FreeTextEntry1] : # Stable TE and stage 4 CKD of unclear cause. \par * Reheck labs.\par * A point of care renal ultrasound was personally performed and the images were reviewed. (The patient understands that this was a brief study to evaluate for hydronephrosis and not a complete renal ultrasound.) The ultrasound showed no significant hydronephrosis. \par * Nonnephrotic proteinuria. Serologies negative.\par * This may be due to 5FU, but this is a rare cause of TE.\par * Currently, risks of renal biopsy outweigh benefits given smaller kidney size and echogenicity, which suggests chronicity.\par * The patient has been counseled that chronic kidney disease is a significant condition and regular office followup with me (at least every 1 - 2 months for now) is essential for monitoring, and that it is their responsibility to make a follow up appointment.\par * The patient has been counseled never to stop taking their medications without discussing it with me or another doctor.\par * The patient has been counseled on risk of acute renal failure and instructed to immediately call and speak with me or go immediately to ER with any severe symptoms, nausea, vomiting, diarrhea, chest pain, or shortness of breath.\par * The patient has been counseled on avoiding NSAIDs.\par * Reducing or avoiding red meat, following a relatively low oxalate diet, and starting folate 800 mg qd has been advised.\par * A counseling information sheet has been given and they were provided sufficient time to review this. All their questions were answered.\par \par \par \par # HTN controlled.\par * The patient's blood pressure was checked with the [employer] [id number]-[id number] using the SPRINT trial protocol after sitting quietly in an empty room with arm supported, back supported, and feet on the floor for 5 minutes. The average of 3 readings were taken. \par * The patient has been advised to check their BP at home with an automatic arm cuff, write down the readings, and reach me directly on the phone immediately if they are persistently > 180 systolic or if SBP is less than 100 or if lightheadedness develops. They were advised to bring in all blood pressure readings and medications next visit.\par * The patient has been counseled that they have a a significant medical condition and regular office followup (at least every 2 months for now) is essential for monitoring, and that it is their responsibility to make follow up appointments.\par * The patient also has been counsled that they must never stop or change any medications without discussing this with me (or another physician). \par * A counseling information sheet has been given and they were provided sufficient time to review this sheet. All their questions were answered.\par \par # DM.\par * Check HGA1c off metformin.\par * Endocrinology referral.\par \par # Carcinoid tumor.\par * Follow up closely with Dr. Arevalo.\par \par * Acidosis.\par * Recheck HCO3 on bicarb.\par

## 2019-05-28 NOTE — CONSULT LETTER
[FreeTextEntry1] : Dear Manuel,\par \par I had the pleasure of seeing Kati Alvarez in followup for kidney disease. My note is attached.\par \par Thank you for the opportunity to participate in the care of your patient.\par \par Please call me on my cell phone at [phone] or in the office at [phone] if you have any questions.\par \par Best regards,\par \par Osbaldo\par \par Osbaldo Hutton MD, FACP, FASN\par [email or web address] Office: [phone]\par Mobile: [phone]

## 2019-06-02 LAB
25(OH)D3 SERPL-MCNC: 8.6 NG/ML
ALBUMIN SERPL ELPH-MCNC: 4.2 G/DL
ALDOSTERONE SERUM: 38.9 NG/DL
ALP BLD-CCNC: 356 U/L
ALT SERPL-CCNC: 14 U/L
ANION GAP SERPL CALC-SCNC: 17 MMOL/L
APPEARANCE: ABNORMAL
AST SERPL-CCNC: 16 U/L
BILIRUB SERPL-MCNC: 0.4 MG/DL
BILIRUBIN URINE: NEGATIVE
BLOOD URINE: ABNORMAL
BUN SERPL-MCNC: 22 MG/DL
CALCIUM SERPL-MCNC: 9.3 MG/DL
CALCIUM SERPL-MCNC: 9.3 MG/DL
CHLORIDE SERPL-SCNC: 109 MMOL/L
CO2 SERPL-SCNC: 16 MMOL/L
COLOR: YELLOW
CREAT SERPL-MCNC: 2.27 MG/DL
CREAT SPEC-SCNC: 103 MG/DL
CREAT/PROT UR: 1.4 RATIO
GLUCOSE QUALITATIVE U: ABNORMAL
GLUCOSE SERPL-MCNC: 130 MG/DL
KETONES URINE: NEGATIVE
LEUKOCYTE ESTERASE URINE: ABNORMAL
MAGNESIUM SERPL-MCNC: 2 MG/DL
NITRITE URINE: NEGATIVE
PARATHYROID HORMONE INTACT: 238 PG/ML
PH URINE: 6
PHOSPHATE SERPL-MCNC: 2.4 MG/DL
POTASSIUM SERPL-SCNC: 3.6 MMOL/L
PROT SERPL-MCNC: 7.2 G/DL
PROT UR-MCNC: 143 MG/DL
PROTEIN URINE: ABNORMAL
SODIUM SERPL-SCNC: 141 MMOL/L
SPECIFIC GRAVITY URINE: 1.02
UROBILINOGEN URINE: NORMAL

## 2019-08-26 ENCOUNTER — APPOINTMENT (OUTPATIENT)
Dept: ENDOCRINOLOGY | Facility: CLINIC | Age: 59
End: 2019-08-26
Payer: COMMERCIAL

## 2019-08-26 VITALS
SYSTOLIC BLOOD PRESSURE: 115 MMHG | WEIGHT: 170 LBS | BODY MASS INDEX: 30.11 KG/M2 | HEART RATE: 76 BPM | DIASTOLIC BLOOD PRESSURE: 73 MMHG

## 2019-08-26 LAB — GLUCOSE BLDC GLUCOMTR-MCNC: 172

## 2019-08-26 PROCEDURE — 99205 OFFICE O/P NEW HI 60 MIN: CPT | Mod: 25

## 2019-08-26 PROCEDURE — 82962 GLUCOSE BLOOD TEST: CPT

## 2019-08-27 ENCOUNTER — LABORATORY RESULT (OUTPATIENT)
Age: 59
End: 2019-08-27

## 2019-08-27 ENCOUNTER — APPOINTMENT (OUTPATIENT)
Dept: NEPHROLOGY | Facility: CLINIC | Age: 59
End: 2019-08-27
Payer: COMMERCIAL

## 2019-08-27 VITALS — DIASTOLIC BLOOD PRESSURE: 68 MMHG | SYSTOLIC BLOOD PRESSURE: 105 MMHG | HEART RATE: 79 BPM

## 2019-08-27 PROCEDURE — 99214 OFFICE O/P EST MOD 30 MIN: CPT

## 2019-08-27 NOTE — END OF VISIT
[FreeTextEntry3] : All medical record entries made by the Scribe were at my, Dr. Mike Zapata, direction and personally dictated by me on 08/26/2019. I have reviewed the chart and agree that the record accurately reflects my personal performance of the history, physical exam, assessment and plan. I have also personally directed, reviewed and agreed with the chart.  [>50% of Time Spent on Counseling for ____] : Greater than 50% of the encounter time was spent on counseling for [unfilled] [Time Spent: ___ minutes] : I have spent [unfilled] minutes of face to face time with the patient

## 2019-08-27 NOTE — HISTORY OF PRESENT ILLNESS
[FreeTextEntry1] : 59 y/o female pt, /73, BMI 30.11, with Hx of hypothyroidism 2/2 magnus- thyroidectomy (in ) and Hx of DM (dx in , pt is off Metformin), referred by Dr. Hutton, presents today to establish endocrine care with me.\par Significant PMHx: CKD (with recent s. 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 Pt received Sandostatin, EVEROLIMUS, 5FU.  \par PSHx: L lung (), magnus- thyroidectomy (in ), heart (), gallbladder (), kidney stones, hysterectomy b/l GAURAV (19),  x2 ( and ), laparoscopy \par FHx: DM (mother, sister), CA (father, grandmother), heart disease (mother) \par SHx: former smoker (quit in ), no etOH use; works as a \par Checks BS sporadically\par Last funduscopic visit: pt states she sees oncologist for her eyes; pt notes they detected a tumor in her eyes previously and she reports her tumor shrunk at her last visit.\par Drug Allergies: PCN\par Follows with nephrologist, oncologist (every month)\par \par Pt states she had a hemithyroidectomy in  and PMHx of thyroid nodules. She notes she was on Synthroid since then.\par In 2017 after chemotherapy, pt states her sugar levels started increasing. She states her last chemotherapy was in 2018.\par In addition, pt states she was going to be started on PRT treatment, and found her kidney function was low in 2018. Pt notes Dr. Hutton DC/ed Metformin and switched pt from Atenolol to Metoprolol. Pt reports she has not been taking Metformin since 2018. \par \par Today pt presents with  and with POCT of 172. \par \par Current Mediations: Synthroid 50mcg, Simvastatin 20mg, Metoprolol 25mg, Somatuline "120 injections once a month" (rx by her Oncologist), Vit D, Prilosec 10mg

## 2019-08-27 NOTE — REASON FOR VISIT
[Initial Eval - Existing Diagnosis] : an initial evaluation of an existing diagnosis [FreeTextEntry1] : DM and thyroid

## 2019-08-27 NOTE — PHYSICAL EXAM
[General Appearance - Alert] : alert [General Appearance - In No Acute Distress] : in no acute distress [Sclera] : the sclera and conjunctiva were normal [PERRL With Normal Accommodation] : pupils were equal in size, round, and reactive to light [Extraocular Movements] : extraocular movements were intact [Outer Ear] : the ears and nose were normal in appearance [Neck Appearance] : the appearance of the neck was normal [Oropharynx] : the oropharynx was normal [Neck Cervical Mass (___cm)] : no neck mass was observed [Jugular Venous Distention Increased] : there was no jugular-venous distention [Thyroid Diffuse Enlargement] : the thyroid was not enlarged [Thyroid Nodule] : there were no palpable thyroid nodules [Auscultation Breath Sounds / Voice Sounds] : lungs were clear to auscultation bilaterally [Heart Rate And Rhythm] : heart rate was normal and rhythm regular [Heart Sounds Gallop] : no gallops [Heart Sounds] : normal S1 and S2 [Murmurs] : no murmurs [Edema] : there was no peripheral edema [Heart Sounds Pericardial Friction Rub] : no pericardial rub [Abdomen Soft] : soft [Veins - Varicosity Changes] : there were no varicosital changes [Bowel Sounds] : normal bowel sounds [Abdomen Mass (___ Cm)] : no abdominal mass palpated [Abdomen Tenderness] : non-tender [Cervical Lymph Nodes Enlarged Anterior Bilaterally] : anterior cervical [Cervical Lymph Nodes Enlarged Posterior Bilaterally] : posterior cervical [Supraclavicular Lymph Nodes Enlarged Bilaterally] : supraclavicular [Abnormal Walk] : normal gait [Nail Clubbing] : no clubbing  or cyanosis of the fingernails [Motor Tone] : muscle strength and tone were normal [Musculoskeletal - Swelling] : no joint swelling seen [Skin Color & Pigmentation] : normal skin color and pigmentation [Skin Turgor] : normal skin turgor [] : no rash [Oriented To Time, Place, And Person] : oriented to person, place, and time [Affect] : the affect was normal [Impaired Insight] : insight and judgment were intact

## 2019-08-27 NOTE — HISTORY OF PRESENT ILLNESS
[FreeTextEntry1] : Kindly referred by Dr. Manuel Arevalo on 31Jan19 for decreased kidney function. \par \par * CKD stable, creatinine 2.13 in August 2019. * Slight hypokalemia, K 3.4. No diuretics, vomiting, or diarrhea. * HCO3 increased to 18 on sodium bicarbonate but she has taken this only three times weekly. \par \par Previous history (28May19): * S/P hysterectomy and bilateral GAURAV. 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 * CKD previous stable, creatinine 2.21. * K decreased to 2.9. Repeat K was improved in ED. She has increased in potassium in her diet. * Oral bicarb started for acidosis. (1 tsp)\par \par Previous history (26Mar19): * Progressive CKD. Creatinine 2.18. * HCO3 15 previously. Baking soda 650 bid started.  * HTN controlled. No lightheadedness. Compliant with medications. Following low salt diet. * Off metformin. * 1.6 g/g proteinuria. \par \par Previous history (20Feb19): * HTN controlled, 120s - 130s at home. No lightheadedness. Compliant with medications. Following low salt diet. * Proteinuria 1.6 g/g. *  Metformin was stopped last visit. Nausea resolved.* CKD stable, creatinine 2.1. * HCO3 15. She has not started baking soda as instructed. 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 She has never had flushing or carcinoid syndrome. Since 2013, she has been treated with monthly sandostatin and in 2014 was treated with everolimus (afinitor). In 2017, she was treated with 5FU (Nov 17 - July 18) and another type of chemotherapy, though she is not on this currently. She was diagnosed with diabetes in 2017 and started on metformin, which she is still taking. In 2017 she was also started on lisinopril by her cardiologist "to protect her kidneys". She first became aware of decreased kidney function in about November of 2018. She is uncertain what her creatinine is but believes her kidney function is 17 percent. (See below.) Labs have been requested and are currently pending. A renal ultrasound on 12/3/18 revealed 9.5/9.8 kidneys which were echogenic. There was no hydronephrosis. The patient denies exposure to chronic NSAIDs, phosphate bowel preparations, PPIs, green smoothies, creatine, or herbal supplements.\par \par One episode of kidney stone which passed spontaneously in 2004. \par \par ADDENDUM: Creatinine 2.07, GFR 25 on labs from 1/7/19. Per discussion with Dr. Arevalo, her creatinine has increased in May 2018. It has been stable since November 2018. \par \par \par

## 2019-08-27 NOTE — ASSESSMENT
[FreeTextEntry1] : # Stable CKD of unclear cause.\par * Possibly due to 5FU, but this is a rare cause of TE.\par * Currently, risks of renal biopsy outweigh benefits given smaller kidney size and echogenicity, which suggests chronicity.\par * * The patient has been counseled that chronic kidney disease is a significant condition and regular office followup with me (at least every 3 months for now) is essential for monitoring, and that it is their responsibility to make a follow up appointment.\par * The patient has been counseled never to stop taking their medications without discussing it with me or another doctor.\par * The patient has been counseled on risk of acute renal failure and instructed to immediately call and speak with me or go immediately to ER with any severe symptoms, nausea, vomiting, diarrhea, chest pain, or shortness of breath.\par * The patient has been counseled on avoiding NSAIDs.\par * Reducing or avoiding red meat, following a relatively low oxalate diet, and starting folate 800 mg qd has been advised.\par * A counseling information sheet has been given and they were provided sufficient time to review this. All their questions were answered.\par \par # BP controlled.\par * The patient's blood pressure was checked with the [employer] [id number]-907XL using the SPRINT trial protocol after sitting quietly in an empty room with arm supported, back supported, and feet on the floor for 5 minutes. The average of 3 readings were taken. \par * The patient has been advised to check their BP at home with an automatic arm cuff, write down the readings, and reach me directly on the phone immediately if they are persistently > 180 systolic or if SBP is less than 100 or if lightheadedness develops. They were advised to bring in all blood pressure readings and medications next visit.\par * The patient has been counseled that they have a a significant medical condition and regular office followup (at least every 3 months for now) is essential for monitoring, and that it is their responsibility to make follow up appointments.\par * The patient also has been counseled that they must never stop or change any medications without discussing this with me (or another physician). \par * A counseling information sheet has been given and they were provided sufficient time to review this sheet. All their questions were answered.\par \par # Carcinoid tumor.\par * Follow up closely with Dr. Arevalo.\par \par # Acidosis.\par * Sodium bicarb 650 mg twice daily.\par \par # Hypokalemia.\par * Type 2 RTA is possible but unlikely.\par * Recheck phos, uric acid next visit.\par * Start k-dur 10 meq daily.\par * Follow up in 1 month.

## 2019-08-27 NOTE — ASSESSMENT
[FreeTextEntry1] : 59 y/o\par \par 1. Hx of DM (dx in 2017 followed by chemotherapy for carcinoid syndrome) with proteinuria, HTN, and CKD:\par Metformin was recently DC/ed. Pt does not have symptoms of osmotic diuresis. POCT: 172,\par Will consider to initiate DPP IV inhibitors. Will send for labs today.\par \par 2. Hx of hypothyroidism:\par Pt is clinically euthyroid, and is on 50 mcg of Levothyroxine. Will send for TFTs today.\par \par Return next week (pt is going away for 1 month).  [Importance of Diet and Exercise] : importance of diet and exercise to improve glycemic control, achieve weight loss and improve cardiovascular health

## 2019-08-27 NOTE — ADDENDUM
[FreeTextEntry1] : I, Alana Duncan, acted solely as a scribe for Dr. Mike Zapata on this date. 08/26/2019.

## 2019-08-27 NOTE — CONSULT LETTER
[FreeTextEntry1] : Dear Dr. Arevalo,\par \par I had the pleasure of seeing Kati Alvarez in followup for kidney disease. My note is attached.\par \par Thank you for the opportunity to participate in the care of your patient.\par \par Please call me on my cell phone at [phone] or in the office at [phone] if you have any questions.\par \par Best regards,\par \par Osbaldo\par \par Osbaldo Hutton MD, FACP, FASN\par [email or web address]\par Office: [phone]\par Mobile: [phone]

## 2019-09-03 ENCOUNTER — APPOINTMENT (OUTPATIENT)
Dept: ENDOCRINOLOGY | Facility: CLINIC | Age: 59
End: 2019-09-03
Payer: COMMERCIAL

## 2019-09-03 VITALS
BODY MASS INDEX: 32.24 KG/M2 | SYSTOLIC BLOOD PRESSURE: 116 MMHG | WEIGHT: 182 LBS | HEART RATE: 66 BPM | DIASTOLIC BLOOD PRESSURE: 78 MMHG

## 2019-09-03 DIAGNOSIS — Z83.3 FAMILY HISTORY OF DIABETES MELLITUS: ICD-10-CM

## 2019-09-03 DIAGNOSIS — Z82.49 FAMILY HISTORY OF ISCHEMIC HEART DISEASE AND OTHER DISEASES OF THE CIRCULATORY SYSTEM: ICD-10-CM

## 2019-09-03 DIAGNOSIS — Z80.9 FAMILY HISTORY OF MALIGNANT NEOPLASM, UNSPECIFIED: ICD-10-CM

## 2019-09-03 LAB
ALBUMIN SERPL ELPH-MCNC: 4.3 G/DL
ALP BLD-CCNC: 374 U/L
ALT SERPL-CCNC: 12 U/L
ANION GAP SERPL CALC-SCNC: 12 MMOL/L
AST SERPL-CCNC: 17 U/L
BILIRUB SERPL-MCNC: 0.4 MG/DL
BUN SERPL-MCNC: 26 MG/DL
CALCIUM SERPL-MCNC: 9.1 MG/DL
CHLORIDE SERPL-SCNC: 109 MMOL/L
CHOLEST SERPL-MCNC: 133 MG/DL
CHOLEST/HDLC SERPL: 1.9 RATIO
CO2 SERPL-SCNC: 18 MMOL/L
CREAT SERPL-MCNC: 2.13 MG/DL
ESTIMATED AVERAGE GLUCOSE: 148 MG/DL
FRUCTOSAMINE SERPL-MCNC: 307 UMOL/L
GLUCOSE BLDC GLUCOMTR-MCNC: 206
GLUCOSE SERPL-MCNC: 141 MG/DL
HBA1C MFR BLD HPLC: 6.8 %
HDLC SERPL-MCNC: 69 MG/DL
LDLC SERPL CALC-MCNC: 38 MG/DL
POTASSIUM SERPL-SCNC: 3.4 MMOL/L
PROT SERPL-MCNC: 7.1 G/DL
SODIUM SERPL-SCNC: 139 MMOL/L
T4 FREE SERPL-MCNC: 1 NG/DL
TRIGL SERPL-MCNC: 128 MG/DL
TSH SERPL-ACNC: 3.4 UIU/ML

## 2019-09-03 PROCEDURE — 82962 GLUCOSE BLOOD TEST: CPT

## 2019-09-03 PROCEDURE — 99214 OFFICE O/P EST MOD 30 MIN: CPT | Mod: 25

## 2019-09-03 NOTE — HISTORY OF PRESENT ILLNESS
[FreeTextEntry1] : 19 A1c 6.8%, Fructosamine 307, s. 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 Pt received Sandostatin, Everolimus, 5FU.  \par PSHx: L lung (), magnus- thyroidectomy (in ), heart (), gallbladder (), kidney stones, hysterectomy b/l GAURAV (19),  x2 ( and ), laparoscopy \par FHx: DM (mother, sister), CA (father, grandmother), heart disease (mother) \par SHx: former smoker (quit in ), no etOH use; works as a \par Checks BS sporadically\par Last funduscopic visit: pt states she sees oncologist for her eyes; pt notes they detected a tumor in her eyes previously and she reports her tumor shrunk at her last visit.\par Drug Allergies: PCN\par Follows with nephrologist, oncologist (every month)\par \par Pt states she had a hemithyroidectomy in  and PMHx of thyroid nodules. She notes she was on Synthroid since then.\par In 2017 after chemotherapy, pt states her sugar levels started increasing. She states her last chemotherapy was in 2018.\par In addition, pt states she was going to be started on PRT treatment, and found her kidney function was low in 2018. Pt notes Dr. Hutton DC/ed Metformin and switched pt from Atenolol to Metoprolol. Pt reports she has not been taking Metformin since 2018. \par \par 9/3/19\par Today pt and her  present for endocrine f/u with POCT 206, feeling well with no major physical complaints.\par Post prandial BS: 209, 202, 259, 197, 140, 186.\par Pt does not check FBS 2/2 "DR routinely in the 120s and 130s."\par Pt gained 12lbs since her last visit here. \par \par Current Mediations: Synthroid 50mcg, Simvastatin 20mg, Metoprolol 25mg, Somatuline "120 injections once a month" (rx by her Oncologist), Vit D, Prilosec 10mg

## 2019-09-03 NOTE — END OF VISIT
[FreeTextEntry3] : All medical record entries made by the Scribe were at my, Dr. Mike Zapata, direction and personally dictated by me on 09/03/2019. I have reviewed the chart and agree that the record accurately reflects my personal performance of the history, physical exam, assessment and plan. I have also personally directed, reviewed and agreed with the chart.  [>50% of Time Spent on Counseling for ____] : Greater than 50% of the encounter time was spent on counseling for [unfilled] [Time Spent: ___ minutes] : I have spent [unfilled] minutes of face to face time with the patient

## 2019-09-03 NOTE — ADDENDUM
[FreeTextEntry1] : I, Alana Duncan, acted solely as a scribe for Dr. Mike Zapata on this date. 09/03/2019.

## 2019-09-03 NOTE — ASSESSMENT
[Importance of Diet and Exercise] : importance of diet and exercise to improve glycemic control, achieve weight loss and improve cardiovascular health [FreeTextEntry1] : 57 y/o F pt with:\par \par 1. Hx of DM (dx in 2017 followed by chemotherapy for carcinoid syndrome) with proteinuria, HTN, and CKD (recent s. creat 2.13 and recent A1c of 6.8%):\par Today's review of pt's glucose readings are in target. In the consideration of any DM treatment, will plan to use SGLT2. For the time being, recommend pt continue to control her DM with diet. \par Refer pt to see RD today.\par \par 2. Hx of hypothyroidism:\par Pt is clinically euthyroid; recommend pt continue with 50 mcg of Levothyroxine.\par \par Return in 4 months. [Self Monitoring of Blood Glucose] : self monitoring of blood glucose

## 2019-09-03 NOTE — REVIEW OF SYSTEMS
[Negative] : Psychiatric [As Noted in HPI] : as noted in HPI [Recent Weight Gain (___ Lbs)] : recent [unfilled] ~Ulb weight gain

## 2019-09-03 NOTE — PHYSICAL EXAM
[Alert] : alert [Normal Sclera/Conjunctiva] : normal sclera/conjunctiva [Normal Outer Ear/Nose] : the ears and nose were normal in appearance [Clear to Auscultation] : lungs were clear to auscultation bilaterally [No Respiratory Distress] : no respiratory distress [Normal Rate] : heart rate was normal  [Normal S1, S2] : normal S1 and S2 [No Edema] : there was no peripheral edema [Spine Straight] : spine straight [Normal Bowel Sounds] : normal bowel sounds [No Rash] : no rash [Normal Gait] : normal gait [Normal Reflexes] : deep tendon reflexes were 2+ and symmetric [Oriented x3] : oriented to person, place, and time [de-identified] : R thyroid lobe palpated

## 2019-09-04 LAB
APPEARANCE: ABNORMAL
BILIRUBIN URINE: NEGATIVE
BLOOD URINE: ABNORMAL
COLOR: YELLOW
CREAT SPEC-SCNC: 125 MG/DL
CREAT SPEC-SCNC: 125 MG/DL
CREAT/PROT UR: 1.4 RATIO
GLUCOSE QUALITATIVE U: ABNORMAL
KETONES URINE: NORMAL
LEUKOCYTE ESTERASE URINE: ABNORMAL
MICROALBUMIN 24H UR DL<=1MG/L-MCNC: 18.3 MG/DL
MICROALBUMIN/CREAT 24H UR-RTO: 146 MG/G
NITRITE URINE: NEGATIVE
PH URINE: 6
PROT UR-MCNC: 175 MG/DL
PROTEIN URINE: ABNORMAL
SPECIFIC GRAVITY URINE: 1.03
UROBILINOGEN URINE: NORMAL

## 2019-10-07 ENCOUNTER — APPOINTMENT (OUTPATIENT)
Dept: NEPHROLOGY | Facility: CLINIC | Age: 59
End: 2019-10-07
Payer: COMMERCIAL

## 2019-10-07 VITALS — SYSTOLIC BLOOD PRESSURE: 115 MMHG | DIASTOLIC BLOOD PRESSURE: 72 MMHG | HEART RATE: 72 BPM

## 2019-10-07 PROCEDURE — 99214 OFFICE O/P EST MOD 30 MIN: CPT

## 2019-10-07 NOTE — HISTORY OF PRESENT ILLNESS
[FreeTextEntry1] : Kindly referred by Dr. Manuel Arevalo on 31Jan19 for decreased kidney function. \par \par * CKD stable, creatinine 2.13. * K 3.4. K-dur 10 meq started last visit. * Labs checked 1 week ago by Dr. Arevalo. (The patient has been advised to call their office and arrange copies of notes/labs to be sent.) * She is only taking sodium bicarbonate three times weekly. \par \par Previous history (27Aug19): * CKD stable, creatinine 2.13 in August 2019. * Slight hypokalemia, K 3.4. No diuretics, vomiting, or diarrhea. * HCO3 increased to 18 on sodium bicarbonate but she has taken this only three times weekly. \par \par Previous history (28May19): * S/P hysterectomy and bilateral GAURAV. 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 * CKD previous stable, creatinine 2.21. * K decreased to 2.9. Repeat K was improved in ED. She has increased in potassium in her diet. * Oral bicarb started for acidosis. (1 tsp)\par \par Previous history (26Mar19): * Progressive CKD. Creatinine 2.18. * HCO3 15 previously. Baking soda 650 bid started.  * HTN controlled. No lightheadedness. Compliant with medications. Following low salt diet. * Off metformin. * 1.6 g/g proteinuria. \par \par Previous history (20Feb19): * HTN controlled, 120s - 130s at home. No lightheadedness. Compliant with medications. Following low salt diet. * Proteinuria 1.6 g/g. *  Metformin was stopped last visit. Nausea resolved.* CKD stable, creatinine 2.1. * HCO3 15. She has not started baking soda as instructed. 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 She has never had flushing or carcinoid syndrome. Since 2013, she has been treated with monthly sandostatin and in 2014 was treated with everolimus (afinitor). In 2017, she was treated with 5FU (Nov 17 - July 18) and another type of chemotherapy, though she is not on this currently. She was diagnosed with diabetes in 2017 and started on metformin, which she is still taking. In 2017 she was also started on lisinopril by her cardiologist "to protect her kidneys". She first became aware of decreased kidney function in about November of 2018. She is uncertain what her creatinine is but believes her kidney function is 17 percent. (See below.) Labs have been requested and are currently pending. A renal ultrasound on 12/3/18 revealed 9.5/9.8 kidneys which were echogenic. There was no hydronephrosis. The patient denies exposure to chronic NSAIDs, phosphate bowel preparations, PPIs, green smoothies, creatine, or herbal supplements.\par \par One episode of kidney stone which passed spontaneously in 2004. \par \par ADDENDUM: Creatinine 2.07, GFR 25 on labs from 1/7/19. Per discussion with Dr. Arevalo, her creatinine has increased in May 2018. It has been stable since November 2018. \par \par \par

## 2019-10-07 NOTE — ASSESSMENT
[FreeTextEntry1] : # Stable CKD of unclear cause.\par * Possibly due to 5FU, but this is a rare cause of TE.\par * Currently, risks of renal biopsy outweigh benefits given smaller kidney size and echogenicity, which suggests chronicity.\par * The patient has been counseled that chronic kidney disease is a significant condition and regular office followup with me (at least every 3 months for now) is essential for monitoring, and that it is their responsibility to make a follow up appointment.\par * The patient has been counseled never to stop taking their medications without discussing it with me or another doctor.\par * The patient has been counseled on risk of acute renal failure and instructed to immediately call and speak with me or go immediately to ER with any severe symptoms, nausea, vomiting, diarrhea, chest pain, or shortness of breath.\par * The patient has been counseled on avoiding NSAIDs.\par * Reducing or avoiding red meat, following a relatively low oxalate diet, and starting folate 800 mg qd has been advised.\par * A counseling information sheet has been given and they were provided sufficient time to review this. All their questions were answered.\par \par # BP controlled.\par * The patient's blood pressure was checked with the [employer] HEM-907XL using the SPRINT trial protocol after sitting quietly in an empty room with arm supported, back supported, and feet on the floor for 5 minutes. The average of 3 readings were taken. \par * The patient has been advised to check their BP at home with an automatic arm cuff, write down the readings, and reach me directly on the phone immediately if they are persistently > 180 systolic or if SBP is less than 100 or if lightheadedness develops. They were advised to bring in all blood pressure readings and medications next visit.\par * The patient has been counseled that they have a a significant medical condition and regular office followup (at least every 3 months for now) is essential for monitoring, and that it is their responsibility to make follow up appointments.\par * The patient also has been counseled that they must never stop or change any medications without discussing this with me (or another physician). \par * A counseling information sheet has been given and they were provided sufficient time to review this sheet. All their questions were answered.\par \par # Hypokalemia.\par * Continue K-dur.\par \par # Acidosis.\par * Sodium bicarb three times weekly.

## 2019-10-07 NOTE — PHYSICAL EXAM
[General Appearance - Alert] : alert [Sclera] : the sclera and conjunctiva were normal [General Appearance - In No Acute Distress] : in no acute distress [PERRL With Normal Accommodation] : pupils were equal in size, round, and reactive to light [Extraocular Movements] : extraocular movements were intact [Oropharynx] : the oropharynx was normal [Outer Ear] : the ears and nose were normal in appearance [Neck Appearance] : the appearance of the neck was normal [Neck Cervical Mass (___cm)] : no neck mass was observed [Thyroid Diffuse Enlargement] : the thyroid was not enlarged [Jugular Venous Distention Increased] : there was no jugular-venous distention [Thyroid Nodule] : there were no palpable thyroid nodules [Auscultation Breath Sounds / Voice Sounds] : lungs were clear to auscultation bilaterally [Heart Rate And Rhythm] : heart rate was normal and rhythm regular [Heart Sounds] : normal S1 and S2 [Heart Sounds Gallop] : no gallops [Murmurs] : no murmurs [Edema] : there was no peripheral edema [Heart Sounds Pericardial Friction Rub] : no pericardial rub [Veins - Varicosity Changes] : there were no varicosital changes [Bowel Sounds] : normal bowel sounds [Abdomen Soft] : soft [Abdomen Tenderness] : non-tender [Abdomen Mass (___ Cm)] : no abdominal mass palpated [Cervical Lymph Nodes Enlarged Posterior Bilaterally] : posterior cervical [Cervical Lymph Nodes Enlarged Anterior Bilaterally] : anterior cervical [Supraclavicular Lymph Nodes Enlarged Bilaterally] : supraclavicular [Abnormal Walk] : normal gait [Nail Clubbing] : no clubbing  or cyanosis of the fingernails [Musculoskeletal - Swelling] : no joint swelling seen [Motor Tone] : muscle strength and tone were normal [Skin Color & Pigmentation] : normal skin color and pigmentation [Skin Turgor] : normal skin turgor [] : no rash [Oriented To Time, Place, And Person] : oriented to person, place, and time [Affect] : the affect was normal [Impaired Insight] : insight and judgment were intact 2

## 2019-10-22 ENCOUNTER — APPOINTMENT (OUTPATIENT)
Dept: ENDOCRINOLOGY | Facility: CLINIC | Age: 59
End: 2019-10-22

## 2020-01-07 ENCOUNTER — APPOINTMENT (OUTPATIENT)
Dept: ENDOCRINOLOGY | Facility: CLINIC | Age: 60
End: 2020-01-07
Payer: COMMERCIAL

## 2020-01-07 VITALS
DIASTOLIC BLOOD PRESSURE: 80 MMHG | WEIGHT: 176 LBS | SYSTOLIC BLOOD PRESSURE: 120 MMHG | BODY MASS INDEX: 31.18 KG/M2 | HEART RATE: 92 BPM

## 2020-01-07 LAB — GLUCOSE BLDC GLUCOMTR-MCNC: 230

## 2020-01-07 PROCEDURE — 82962 GLUCOSE BLOOD TEST: CPT

## 2020-01-07 PROCEDURE — 99214 OFFICE O/P EST MOD 30 MIN: CPT | Mod: 25

## 2020-01-07 NOTE — REVIEW OF SYSTEMS
[Negative] : Endocrine [Recent Weight Loss (___ Lbs)] : recent [unfilled] ~Ulb weight loss [Nocturia] : nocturia [As Noted in HPI] : as noted in HPI

## 2020-01-09 NOTE — HISTORY OF PRESENT ILLNESS
[FreeTextEntry1] : 19 A1c 6.8%, Fructosamine 307, s. creat 2.13, TSH 3.40, Free T4 1.0, LDL-c 38, \par 19 microalb/creat ratio 146 \par 10/3/19 s. creat 2.01, \par 20 POCT A1c 7.1%\par \par 59 y/o F pt, /80, BMI 31.18, with Hx of hypothyroidism 2/2 magnus- thyroidectomy (in ) and Hx of DM (dx in , pt is off Metformin).\par Significant PMHx: CKD, carcinoid tumor in R ovary (initially dx with carcinoid tumor in the lung in , involving the heart in  and liver in ), HTN\par Pt never had symptoms of carcinoid syndrome. Pt received Sandostatin, Everolimus, 5FU.  \par PSHx: L lung (), L magnus- thyroidectomy (in ), heart (), gallbladder (), kidney stones, hysterectomy b/l GAURAV (19),  x2 ( and ), laparoscopy \par FHx: DM (mother, sister), CA (father, grandmother), heart disease (mother) \par SHx: former smoker (quit in ), no etOH use; works as a \par Checks BS sporadically\par Last funduscopic visit: pt states she sees oncologist for her eyes; pt notes they detected a tumor in her eyes previously and she reports her tumor shrunk at her last visit.\par Drug Allergies: PCN\par Follows with nephrologist, oncologist (every month)\par \par Pt states she had a hemithyroidectomy in .\par She notes she was on Synthroid since then.\par In 2017 after chemotherapy, pt states her sugar levels started increasing. She states her last chemotherapy was in 2018.\par Abnormal kidney function in 2018. Pt notes Dr. Hutton DC/ed Metformin and switched pt from Atenolol to Metoprolol. \par \par \par 20\par Pt states her kidney function was detected while chemo studies were being done in ~.\par \par Today pt presents for endocrine f/u with POCT 230, feeling well with c/o nocturia 4x. Pt notes she broke her foot in 10/2019 and was unable to perform physical activities 2/2 this. \par Pt lost 6lbs since her last visit here.\par Denies pins and needle sensation in lower extremities. \par \par Current Mediations: Synthroid 50mcg, Simvastatin 20mg, Metoprolol 25mg, Somatuline  (rx by her Oncologist), Vit D, Prilosec 10mg, ASA 81 mg, Potassium  
normal...

## 2020-01-09 NOTE — ASSESSMENT
[Importance of Diet and Exercise] : importance of diet and exercise to improve glycemic control, achieve weight loss and improve cardiovascular health [Self Monitoring of Blood Glucose] : self monitoring of blood glucose [FreeTextEntry1] : 58 y/o F pt with:\par \par 1. Hx of DM (dx in 2017 followed by chemotherapy for carcinoid syndrome) with proteinuria, HTN, and CKD:\par Pt's DM is worsening, today POCT A1c was 7.1%. Pt's s. creat remains unchanged and she sustained a R foot injury which limited her physical activities. Recommend pt start Tradjenta 5mg QD, and check BS before and after meals. Will order labs today.\par \par 2. Hx of hypothyroidism:\par Pt appears to be clinically euthyroid; recommend pt continue with 50 mcg of Levothyroxine.\par \par Return in 1 month.

## 2020-01-09 NOTE — PHYSICAL EXAM
[Alert] : alert [Normal Outer Ear/Nose] : the ears and nose were normal in appearance [Normal Sclera/Conjunctiva] : normal sclera/conjunctiva [Clear to Auscultation] : lungs were clear to auscultation bilaterally [No Respiratory Distress] : no respiratory distress [Normal Rate] : heart rate was normal  [Normal S1, S2] : normal S1 and S2 [Normal Bowel Sounds] : normal bowel sounds [Spine Straight] : spine straight [No Edema] : there was no peripheral edema [Normal Gait] : normal gait [No Rash] : no rash [Normal Reflexes] : deep tendon reflexes were 2+ and symmetric [Oriented x3] : oriented to person, place, and time [Right Foot Was Examined] : right foot ~C was examined [Left Foot Was Examined] : left foot ~C was examined [2+] : 2+ in the dorsalis pedis [Normal Sensation on Monofilament Testing] : normal sensation on monofilament testing of lower extremities [Diminished Throughout Both Feet] : normal tactile sensation with monofilament testing throughout both feet [de-identified] : R thyroid lobe palpated

## 2020-01-09 NOTE — END OF VISIT
[FreeTextEntry3] : All medical record entries made by the Scribe were at my, Dr. Mike Zapata, direction and personally dictated by me on 01/07/2020. I have reviewed the chart and agree that the record accurately reflects my personal performance of the history, physical exam, assessment and plan. I have also personally directed, reviewed and agreed with the chart.  [>50% of Time Spent on Counseling for ____] : Greater than 50% of the encounter time was spent on counseling for [unfilled] [Time Spent: ___ minutes] : I have spent [unfilled] minutes of face to face time with the patient

## 2020-01-10 ENCOUNTER — APPOINTMENT (OUTPATIENT)
Dept: NEPHROLOGY | Facility: CLINIC | Age: 60
End: 2020-01-10
Payer: COMMERCIAL

## 2020-01-10 VITALS — SYSTOLIC BLOOD PRESSURE: 108 MMHG | DIASTOLIC BLOOD PRESSURE: 67 MMHG | HEART RATE: 68 BPM

## 2020-01-10 PROCEDURE — 99214 OFFICE O/P EST MOD 30 MIN: CPT

## 2020-01-10 NOTE — HISTORY OF PRESENT ILLNESS
[FreeTextEntry1] : Kindly referred by Dr. Manuel Arevalo on 31Jan19 for decreased kidney function. \par \par * CKD stable. * Slight hypokalemia. * HCO3 19. She has not taken sodium bicarbonate. \par \par Previous history (07Oct19): * CKD stable, creatinine 2.13. * K 3.4. K-dur 10 meq started last visit. * Labs checked 1 week ago by Dr. Arevalo. (The patient has been advised to call their office and arrange copies of notes/labs to be sent.) * She is only taking sodium bicarbonate three times weekly. \par \par Previous history (27Aug19): * CKD stable, creatinine 2.13 in August 2019. * Slight hypokalemia, K 3.4. No diuretics, vomiting, or diarrhea. * HCO3 increased to 18 on sodium bicarbonate but she has taken this only three times weekly. \par \par Previous history (28May19): * S/P hysterectomy and bilateral GAURAV. 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 * CKD previous stable, creatinine 2.21. * K decreased to 2.9. Repeat K was improved in ED. She has increased in potassium in her diet. * Oral bicarb started for acidosis. (1 tsp)\par \par Previous history (26Mar19): * Progressive CKD. Creatinine 2.18. * HCO3 15 previously. Baking soda 650 bid started.  * HTN controlled. No lightheadedness. Compliant with medications. Following low salt diet. * Off metformin. * 1.6 g/g proteinuria. \par \par Previous history (20Feb19): * HTN controlled, 120s - 130s at home. No lightheadedness. Compliant with medications. Following low salt diet. * Proteinuria 1.6 g/g. *  Metformin was stopped last visit. Nausea resolved.* CKD stable, creatinine 2.1. * HCO3 15. She has not started baking soda as instructed. 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 She has never had flushing or carcinoid syndrome. Since 2013, she has been treated with monthly sandostatin and in 2014 was treated with everolimus (afinitor). In 2017, she was treated with 5FU (Nov 17 - July 18) and another type of chemotherapy, though she is not on this currently. She was diagnosed with diabetes in 2017 and started on metformin, which she is still taking. In 2017 she was also started on lisinopril by her cardiologist "to protect her kidneys". She first became aware of decreased kidney function in about November of 2018. She is uncertain what her creatinine is but believes her kidney function is 17 percent. (See below.) Labs have been requested and are currently pending. A renal ultrasound on 12/3/18 revealed 9.5/9.8 kidneys which were echogenic. There was no hydronephrosis. The patient denies exposure to chronic NSAIDs, phosphate bowel preparations, PPIs, green smoothies, creatine, or herbal supplements.\par \par One episode of kidney stone which passed spontaneously in 2004. \par \par ADDENDUM: Creatinine 2.07, GFR 25 on labs from 1/7/19. Per discussion with Dr. Arevalo, her creatinine has increased in May 2018. It has been stable since November 2018. \par \par \par

## 2020-01-10 NOTE — ASSESSMENT
[FreeTextEntry1] : # Stable CKD of unclear cause.\par * Possibly due to 5FU, but this is a rare cause of TE.\par * Currently, risks of renal biopsy outweigh benefits given smaller kidney size and echogenicity, which suggests chronicity.\par * The patient has been counseled that chronic kidney disease is a significant condition and regular office followup with me (at least every 2 months for now) is essential for monitoring, and that it is their responsibility to make a follow up appointment.\par * The patient has been counseled never to stop taking their medications without discussing it with me or another doctor.\par * The patient has been counseled on risk of acute renal failure and instructed to immediately call and speak with me or go immediately to ER with any severe symptoms, nausea, vomiting, diarrhea, chest pain, or shortness of breath.\par * The patient has been counseled on avoiding NSAIDs.\par * Reducing or avoiding red meat, following a relatively low oxalate diet, and starting folate 800 mg qd has been advised.\par * A counseling information sheet has been given and they were provided sufficient time to review this. All their questions were answered.\par \par # BP controlled.\par * The patient's blood pressure was checked with the [employer] [id number] using the SPRINT trial protocol after sitting quietly in an empty room with arm supported, back supported, and feet on the floor for 5 minutes. The average of 3 readings were taken. \par * The patient has been advised to check their BP at home with an automatic arm cuff, write down the readings, and reach me directly on the phone immediately if they are persistently > 180 systolic or if SBP is less than 100 or if lightheadedness develops. They were advised to bring in all blood pressure readings and medications next visit.\par * The patient has been counseled that they have a a significant medical condition and regular office followup (at least every 2 months for now) is essential for monitoring, and that it is their responsibility to make follow up appointments.\par * The patient also has been counseled that they must never stop or change any medications without discussing this with me (or another physician). \par * A counseling information sheet has been given and they were provided sufficient time to review this sheet. All their questions were answered.\par \par # Hypokalemia.\par * Increase k-dur to 10 bid. \par \par # Acidosis.\par * Restart sodium bicarb 1/2 tsp TIW.

## 2020-01-10 NOTE — PHYSICAL EXAM
[General Appearance - Alert] : alert [Sclera] : the sclera and conjunctiva were normal [General Appearance - In No Acute Distress] : in no acute distress [Oropharynx] : the oropharynx was normal [Outer Ear] : the ears and nose were normal in appearance [Extraocular Movements] : extraocular movements were intact [PERRL With Normal Accommodation] : pupils were equal in size, round, and reactive to light [Neck Appearance] : the appearance of the neck was normal [Jugular Venous Distention Increased] : there was no jugular-venous distention [Neck Cervical Mass (___cm)] : no neck mass was observed [Thyroid Diffuse Enlargement] : the thyroid was not enlarged [Thyroid Nodule] : there were no palpable thyroid nodules [Heart Sounds] : normal S1 and S2 [Auscultation Breath Sounds / Voice Sounds] : lungs were clear to auscultation bilaterally [Heart Rate And Rhythm] : heart rate was normal and rhythm regular [Heart Sounds Pericardial Friction Rub] : no pericardial rub [Heart Sounds Gallop] : no gallops [Murmurs] : no murmurs [Veins - Varicosity Changes] : there were no varicosital changes [Edema] : there was no peripheral edema [Bowel Sounds] : normal bowel sounds [Abdomen Soft] : soft [Abdomen Mass (___ Cm)] : no abdominal mass palpated [Abdomen Tenderness] : non-tender [Cervical Lymph Nodes Enlarged Posterior Bilaterally] : posterior cervical [Supraclavicular Lymph Nodes Enlarged Bilaterally] : supraclavicular [Cervical Lymph Nodes Enlarged Anterior Bilaterally] : anterior cervical [Nail Clubbing] : no clubbing  or cyanosis of the fingernails [Abnormal Walk] : normal gait [Motor Tone] : muscle strength and tone were normal [Skin Turgor] : normal skin turgor [Musculoskeletal - Swelling] : no joint swelling seen [Skin Color & Pigmentation] : normal skin color and pigmentation [] : no rash [Impaired Insight] : insight and judgment were intact [Oriented To Time, Place, And Person] : oriented to person, place, and time [Affect] : the affect was normal

## 2020-01-21 ENCOUNTER — APPOINTMENT (OUTPATIENT)
Dept: ENDOCRINOLOGY | Facility: CLINIC | Age: 60
End: 2020-01-21
Payer: COMMERCIAL

## 2020-01-21 VITALS
BODY MASS INDEX: 31.18 KG/M2 | WEIGHT: 176 LBS | DIASTOLIC BLOOD PRESSURE: 71 MMHG | SYSTOLIC BLOOD PRESSURE: 111 MMHG | HEART RATE: 72 BPM

## 2020-01-21 PROCEDURE — 99214 OFFICE O/P EST MOD 30 MIN: CPT | Mod: 25

## 2020-01-21 PROCEDURE — 82962 GLUCOSE BLOOD TEST: CPT

## 2020-01-23 NOTE — HISTORY OF PRESENT ILLNESS
[FreeTextEntry1] : 19 A1c 6.8%, Fructosamine 307, s. creat 2.13, TSH 3.40, Free T4 1.0, LDL-c 38, \par 19 microalb/creat ratio 146 \par 10/3/19 s. creat 2.01, \par 20 POCT A1c 7.1%, A1c 7.0%, TSH 4.19, Free T4 1.1, s. 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 Pt received Sandostatin, Everolimus, 5FU.  \par PSHx: L lung (), L magnus- thyroidectomy (in ), heart (), gallbladder (), kidney stones, hysterectomy b/l GAURAV (19),  x2 ( and ), laparoscopy \par FHx: DM (mother, sister), CA (father, grandmother), heart disease (mother) \par SHx: former smoker (quit in ), no etOH use; works as a \par Checks BS sporadically\par Last funduscopic visit: in 2020; pt notes they detected a tumor in her eyes previously and she reports her tumor shrunk at her last visit.\par Drug Allergies: PCN\par Follows with nephrologist, oncologist (every month)\par \par Pt states she had a hemithyroidectomy in .\par She notes she was on Synthroid since then.\par In 2017 after chemotherapy, pt states her sugar levels started increasing. She states her last chemotherapy was in 2018.\par Abnormal kidney function in 2018. Pt notes Dr. Hutton DC/ed Metformin and switched pt from Atenolol to Metoprolol. \par \par 20\par Pt states her kidney function was detected while chemo studies were being done in ~2018.\par \par Today pt presents for endocrine f/u with POCT 230, feeling well with c/o nocturia 4x. Pt notes she broke her foot in 10/2019 and was unable to perform physical activities 2/2 this. \par Pt lost 6lbs since her last visit here.\par Denies pins and needle sensation in lower extremities. \par \par 20\par Today pt presents for endocrine f/u with POCT 122, feeling well. Review of BS readings show FBS are in target from 127-160\par Pt notes she has been taking Somatuline 1 dose per month since last year and that her last chemo therapy was in 2018.\par Denies blurry vision.\par \par Current Mediations: Tradjenta 5mg QD (started on 20), Synthroid 50mcg, Simvastatin 20mg, Metoprolol 25mg, Somatuline (Lanreotide; rx by her Oncologist), Vit D, Prilosec 10mg, ASA 81 mg, Potassium

## 2020-01-23 NOTE — ASSESSMENT
[Importance of Diet and Exercise] : importance of diet and exercise to improve glycemic control, achieve weight loss and improve cardiovascular health [Self Monitoring of Blood Glucose] : self monitoring of blood glucose [Levothyroxine] : The patient was instructed to take Levothyroxine on an empty stomach, separate from vitamins, and wait at least 30 minutes before eating [FreeTextEntry1] : 60 y/o F pt with:\par \par 1. Hx of T2DM (dx in 2017 followed by chemotherapy for carcinoid syndrome) with proteinuria, HTN, and CKD:\par Pt is on Lanreotide, which can impair beta cell insulin secretion. Pt has maintained her BS, with FBS less than 150. Encouraged pt to maintain her diet; pt has limited physical activity 2/2 foot injury. She is on Simvastatin for hypercholesterolemia. Pt's A1c is rising, recently 7% with recent s. creat 1.91. Recommend pt continue with Tradjenta 5mg QD, and check BS before and after meals. \par \par 2. Hx of hypothyroidism:\par Pt appears to be clinically euthyroid; recommend pt continue with 50 mcg of Levothyroxine.\par \par Return in 6 months.

## 2020-01-23 NOTE — END OF VISIT
[FreeTextEntry3] : All medical record entries made by the Scribe were at my, Dr. Mike Zapata, direction and personally dictated by me on 01/21/2020. I have reviewed the chart and agree that the record accurately reflects my personal performance of the history, physical exam, assessment and plan. I have also personally directed, reviewed and agreed with the chart.  [>50% of Time Spent on Counseling for ____] : Greater than 50% of the encounter time was spent on counseling for [unfilled] [Time Spent: ___ minutes] : I have spent [unfilled] minutes of face to face time with the patient

## 2020-01-23 NOTE — ADDENDUM
[FreeTextEntry1] : I, Alana Duncan, acted solely as a scribe for Dr. Mike Zapata on this date. 01/21/2020.

## 2020-01-23 NOTE — PHYSICAL EXAM
[Alert] : alert [Normal Sclera/Conjunctiva] : normal sclera/conjunctiva [Normal Outer Ear/Nose] : the ears and nose were normal in appearance [No Respiratory Distress] : no respiratory distress [Clear to Auscultation] : lungs were clear to auscultation bilaterally [Normal Rate] : heart rate was normal  [Normal S1, S2] : normal S1 and S2 [No Edema] : there was no peripheral edema [Normal Bowel Sounds] : normal bowel sounds [Spine Straight] : spine straight [Normal Gait] : normal gait [No Rash] : no rash [Right Foot Was Examined] : right foot ~C was examined [Left Foot Was Examined] : left foot ~C was examined [2+] : 2+ in the dorsalis pedis [Normal Reflexes] : deep tendon reflexes were 2+ and symmetric [Normal Sensation on Monofilament Testing] : normal sensation on monofilament testing of lower extremities [Oriented x3] : oriented to person, place, and time [Diminished Throughout Both Feet] : normal tactile sensation with monofilament testing throughout both feet [de-identified] : R thyroid lobe palpated

## 2020-01-27 LAB
ALBUMIN SERPL ELPH-MCNC: 4.4 G/DL
ALP BLD-CCNC: 321 U/L
ALT SERPL-CCNC: 13 U/L
ANION GAP SERPL CALC-SCNC: 13 MMOL/L
AST SERPL-CCNC: 16 U/L
BILIRUB SERPL-MCNC: 0.5 MG/DL
BUN SERPL-MCNC: 24 MG/DL
CALCIUM SERPL-MCNC: 9.2 MG/DL
CHLORIDE SERPL-SCNC: 108 MMOL/L
CHOLEST SERPL-MCNC: 148 MG/DL
CHOLEST/HDLC SERPL: 2.2 RATIO
CO2 SERPL-SCNC: 19 MMOL/L
CREAT SERPL-MCNC: 1.97 MG/DL
CREAT SPEC-SCNC: 164 MG/DL
ESTIMATED AVERAGE GLUCOSE: 154 MG/DL
GLUCOSE BLDC GLUCOMTR-MCNC: 122
GLUCOSE SERPL-MCNC: 182 MG/DL
HBA1C MFR BLD HPLC: 7 %
HBA1C MFR BLD HPLC: 7.1
HDLC SERPL-MCNC: 68 MG/DL
LDLC SERPL CALC-MCNC: 41 MG/DL
MICROALBUMIN 24H UR DL<=1MG/L-MCNC: 30.2 MG/DL
MICROALBUMIN/CREAT 24H UR-RTO: 184 MG/G
POTASSIUM SERPL-SCNC: 3.5 MMOL/L
PROT SERPL-MCNC: 7.2 G/DL
SODIUM SERPL-SCNC: 140 MMOL/L
T4 FREE SERPL-MCNC: 1.1 NG/DL
TRIGL SERPL-MCNC: 195 MG/DL
TSH SERPL-ACNC: 4.19 UIU/ML

## 2020-05-28 ENCOUNTER — APPOINTMENT (OUTPATIENT)
Dept: NEPHROLOGY | Facility: CLINIC | Age: 60
End: 2020-05-28
Payer: MEDICARE

## 2020-05-28 VITALS — SYSTOLIC BLOOD PRESSURE: 110 MMHG | DIASTOLIC BLOOD PRESSURE: 66 MMHG

## 2020-05-28 PROCEDURE — 99214 OFFICE O/P EST MOD 30 MIN: CPT

## 2020-05-28 NOTE — HISTORY OF PRESENT ILLNESS
[FreeTextEntry1] : Kindly referred by Dr. Manuel Arevalo on 31Jan19 for decreased kidney function. \par \par * CKD previously stable, creatinine 1.97 in 1/20. * K-dur increased to 10 bid for K of 3.5. * Sodium bicarb (1/2 tsp TIW) restarted for HCO3 of 19. * Labs checked by Dr. Arevalo today. \par \par Previous history (10Jan20): * CKD stable. * Slight hypokalemia. * HCO3 19. She has not taken sodium bicarbonate. \par \par Previous history (07Oct19): * CKD stable, creatinine 2.13. * K 3.4. K-dur 10 meq started last visit. * Labs checked 1 week ago by Dr. Arevalo. (The patient has been advised to call their office and arrange copies of notes/labs to be sent.) * She is only taking sodium bicarbonate three times weekly. \par \par Previous history (27Aug19): * CKD stable, creatinine 2.13 in August 2019. * Slight hypokalemia, K 3.4. No diuretics, vomiting, or diarrhea. * HCO3 increased to 18 on sodium bicarbonate but she has taken this only three times weekly. \par \par Previous history (28May19): * S/P hysterectomy and bilateral GAURAV. 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 * CKD previous stable, creatinine 2.21. * K decreased to 2.9. Repeat K was improved in ED. She has increased in potassium in her diet. * Oral bicarb started for acidosis. (1 tsp)\par \par Previous history (26Mar19): * Progressive CKD. Creatinine 2.18. * HCO3 15 previously. Baking soda 650 bid started.  * HTN controlled. No lightheadedness. Compliant with medications. Following low salt diet. * Off metformin. * 1.6 g/g proteinuria. \par \par Previous history (20Feb19): * HTN controlled, 120s - 130s at home. No lightheadedness. Compliant with medications. Following low salt diet. * Proteinuria 1.6 g/g. *  Metformin was stopped last visit. Nausea resolved.* CKD stable, creatinine 2.1. * HCO3 15. She has not started baking soda as instructed. 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 She has never had flushing or carcinoid syndrome. Since 2013, she has been treated with monthly sandostatin and in 2014 was treated with everolimus (afinitor). In 2017, she was treated with 5FU (Nov 17 - July 18) and another type of chemotherapy, though she is not on this currently. She was diagnosed with diabetes in 2017 and started on metformin, which she is still taking. In 2017 she was also started on lisinopril by her cardiologist "to protect her kidneys". She first became aware of decreased kidney function in about November of 2018. She is uncertain what her creatinine is but believes her kidney function is 17 percent. (See below.) Labs have been requested and are currently pending. A renal ultrasound on 12/3/18 revealed 9.5/9.8 kidneys which were echogenic. There was no hydronephrosis. The patient denies exposure to chronic NSAIDs, phosphate bowel preparations, PPIs, green smoothies, creatine, or herbal supplements.\par \par One episode of kidney stone which passed spontaneously in 2004. \par \par ADDENDUM: Creatinine 2.07, GFR 25 on labs from 1/7/19. Per discussion with Dr. Arevalo, her creatinine has increased in May 2018. It has been stable since November 2018. \par \par \par

## 2020-05-28 NOTE — PHYSICAL EXAM
[General Appearance - Alert] : alert [General Appearance - In No Acute Distress] : in no acute distress [Extraocular Movements] : extraocular movements were intact [Sclera] : the sclera and conjunctiva were normal [PERRL With Normal Accommodation] : pupils were equal in size, round, and reactive to light [Outer Ear] : the ears and nose were normal in appearance [Oropharynx] : the oropharynx was normal [Neck Cervical Mass (___cm)] : no neck mass was observed [Neck Appearance] : the appearance of the neck was normal [Thyroid Diffuse Enlargement] : the thyroid was not enlarged [Jugular Venous Distention Increased] : there was no jugular-venous distention [Thyroid Nodule] : there were no palpable thyroid nodules [Auscultation Breath Sounds / Voice Sounds] : lungs were clear to auscultation bilaterally [Heart Sounds] : normal S1 and S2 [Heart Rate And Rhythm] : heart rate was normal and rhythm regular [Murmurs] : no murmurs [Heart Sounds Gallop] : no gallops [Heart Sounds Pericardial Friction Rub] : no pericardial rub [Veins - Varicosity Changes] : there were no varicosital changes [Edema] : there was no peripheral edema [Bowel Sounds] : normal bowel sounds [Abdomen Tenderness] : non-tender [Abdomen Soft] : soft [Cervical Lymph Nodes Enlarged Posterior Bilaterally] : posterior cervical [Abdomen Mass (___ Cm)] : no abdominal mass palpated [Abnormal Walk] : normal gait [Supraclavicular Lymph Nodes Enlarged Bilaterally] : supraclavicular [Cervical Lymph Nodes Enlarged Anterior Bilaterally] : anterior cervical [Nail Clubbing] : no clubbing  or cyanosis of the fingernails [Musculoskeletal - Swelling] : no joint swelling seen [Motor Tone] : muscle strength and tone were normal [Skin Turgor] : normal skin turgor [Skin Color & Pigmentation] : normal skin color and pigmentation [Oriented To Time, Place, And Person] : oriented to person, place, and time [Impaired Insight] : insight and judgment were intact [] : no rash [Affect] : the affect was normal

## 2020-05-28 NOTE — ASSESSMENT
[FreeTextEntry1] : # CKD stage 3 stable.\par * The patient has been counseled that chronic kidney disease is a significant condition and regular office followup with me (at least every 4 months for now) is essential for monitoring, and that it is their responsibility to make a follow up appointment.\par * A counseling information sheet had been given. All their questions were answered.\par * The patient has been counseled never to stop taking their medications without discussing it with me or another doctor.\par * The patient has been counseled on risk of acute renal failure and instructed to immediately call and speak with me or go immediately to ER with any severe symptoms, nausea, vomiting, diarrhea, chest pain, or shortness of breath.\par * The patient has been counseled on avoiding NSAIDs.\par \par # HTN controlled.\par * A counseling information sheet had been given. All their questions were answered.\par * The patient has been counseled to check their BP at home with an automatic arm cuff, write down the readings, and reach me directly on the phone immediately if they are persistently > 180 systolic or if SBP is less than 100 or if lightheadedness develops. They were counseled to bring in all blood pressure readings and medications next visit.\par * The patient has been counseled that they have a a significant medical condition and regular office followup (at least every 4 months for now) is essential for monitoring, and that it is their responsibility to make follow up appointments.\par * The patient also has been counseled that they must never stop or change any medications without discussing this with me (or another physician). \par \par # Hypokalemia.\par * Cont K replacement.\par \par # Metabolic acidosis.\par * Continue bicarb.

## 2020-07-06 ENCOUNTER — APPOINTMENT (OUTPATIENT)
Dept: ENDOCRINOLOGY | Facility: CLINIC | Age: 60
End: 2020-07-06

## 2020-07-15 ENCOUNTER — RX RENEWAL (OUTPATIENT)
Age: 60
End: 2020-07-15

## 2020-07-20 ENCOUNTER — RX RENEWAL (OUTPATIENT)
Age: 60
End: 2020-07-20

## 2020-07-28 ENCOUNTER — APPOINTMENT (OUTPATIENT)
Dept: ENDOCRINOLOGY | Facility: CLINIC | Age: 60
End: 2020-07-28
Payer: MEDICARE

## 2020-07-28 VITALS
HEART RATE: 81 BPM | DIASTOLIC BLOOD PRESSURE: 72 MMHG | WEIGHT: 178 LBS | HEIGHT: 63 IN | SYSTOLIC BLOOD PRESSURE: 119 MMHG | BODY MASS INDEX: 31.54 KG/M2

## 2020-07-28 PROCEDURE — 82962 GLUCOSE BLOOD TEST: CPT

## 2020-07-28 PROCEDURE — 99214 OFFICE O/P EST MOD 30 MIN: CPT | Mod: 25

## 2020-07-28 PROCEDURE — 36415 COLL VENOUS BLD VENIPUNCTURE: CPT

## 2020-07-29 ENCOUNTER — APPOINTMENT (OUTPATIENT)
Dept: ENDOCRINOLOGY | Facility: CLINIC | Age: 60
End: 2020-07-29

## 2020-07-29 NOTE — HISTORY OF PRESENT ILLNESS
[Medical Office: ([facility])___] : at the medical office located in  [Home] : at home, [unfilled] , at the time of the visit. [Verbal consent obtained from patient] : the patient, [unfilled] [FreeTextEntry1] : 60 y/o F pt, with Hx of hypothyroidism 2/2 L hemithyroidectomy (in ), and Hx of DM (dx in ) with DM related complications of proteinuria, and CKD (deterioration of kidney function noticed in 2018). 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 Pt received Sandostatin, Everolimus, 5FU. Last chemotherapy in 2018. Pt is currently on Somatuline once per month. \par Significant PMHx: HTN, kidney stones\par PSHx: L lung (), L magnus- thyroidectomy (in ), heart (), gallbladder (), kidney stones, hysterectomy b/l GAURAV (19),  x2 ( and ), laparoscopy \par FHx: DM (mother, sister), CA (father, grandmother), heart disease (mother) \par SHx: Former smoker (quit in ). No EtOH use. Works as a .\par Checks BS sporadically\par Last funduscopic visit: in 2020; the tumor previously detected in her eyes have shrunk as per pt. \par Follows with nephrologist, oncologist (every month)\par \par 20\par Today pt presents for endocrine f/u with POCT 192, feeling well with c/o some difficulties sleeping. \par Pt states FBS of 154. \par She notes post prandial BS less than 180\par Pt states she takes "120 something mg" of Somatuline injections for the past 1 yr and 1/2. \par Denies diarrhea, hot flashes, palpitations, and muscle weakness. \par \par 2020 \par Pt did not have any questions prior to the initiation of the telehealth visit. \par Pt presents today via telehealth for endocrine f/u, feeling \par \par Current Mediations: Tradjenta 5mg QD (started on 20), Synthroid 50mcg, Simvastatin 20mg, Metoprolol 25mg, Somatuline (Lanreotide; rx by her Oncologist), Vit D, Prilosec 10mg, ASA 81 mg, Potassium \par \par Labs:\par -20: A1c 6.8%, s.creat 1.81,  (H), LDL-c 45, Microalb/Creat 75, TSH 3.66, Free T4 1.1\par - 20: A1c 7.0%, s.creat 1.97, , LDL-c 41, Microalb/Creat 184, TSH 4.19, Free T4 1.1\par - 2019: A1c 6.8%, Fructosamine 307, s. creat 2.13,  LDL-c 38, Microalb/Creat 146, TSH 3.40, Free T4 1.0

## 2020-07-31 NOTE — ASSESSMENT
[Importance of Diet and Exercise] : importance of diet and exercise to improve glycemic control, achieve weight loss and improve cardiovascular health [Self Monitoring of Blood Glucose] : self monitoring of blood glucose [Levothyroxine] : The patient was instructed to take Levothyroxine on an empty stomach, separate from vitamins, and wait at least 30 minutes before eating [FreeTextEntry1] : 60 y/o F pt with:\par \par 1. T2DM (dx in 2017 followed by chemotherapy for carcinoid syndrome) with proteinuria, HTN, and CKD:\par Pt is gaining weight 2/2 stay home orders for COVID precautions. She is unable to exercise. Pt is currently on Somatuline for carcinoid tumor treatment. She has hx of CKD, which has improved her s. creat was recently 1.97. \par Set goal for - 140 with post prandial BS less than 180. Recommend 5-10% weight reduction, continue with Tradjenta 5mg QD, and check BS before and after meals. Refer pt to f/u with Nephrologist, Ophthalmologist. \par \par 2. Hypothyroidism:\par Pt is clinically euthyroid; recommend pt continue with 50 mcg of Levothyroxine. Will repeat TFTs.\par \par Return in 6 months.

## 2020-07-31 NOTE — REVIEW OF SYSTEMS
[Negative] : Neurological [As Noted in HPI] : as noted in HPI [Palpitations] : no palpitations [Muscle Weakness] : no muscle weakness [Diarrhea] : no diarrhea [Hot Flashes] : no hot flashes

## 2020-07-31 NOTE — END OF VISIT
[Time Spent: ___ minutes] : I have spent [unfilled] minutes of time on the encounter. [>50% of the face to face encounter time was spent on counseling and/or coordination of care for ___] : Greater than 50% of the face to face encounter time was spent on counseling and/or coordination of care for [unfilled] [FreeTextEntry3] : All medical record entries made by the Scribe were at my, Dr. Mike Zapata, direction and personally dictated by me on 07/28/2020. I have reviewed the chart and agree that the record accurately reflects my personal performance of the history, physical exam, assessment and plan. I have also personally directed, reviewed and agreed with the chart.

## 2020-07-31 NOTE — HISTORY OF PRESENT ILLNESS
[FreeTextEntry1] : 19 A1c 6.8%, Fructosamine 307, s. creat 2.13, TSH 3.40, Free T4 1.0, LDL-c 38, \par 19 microalb/creat ratio 146 \par 10/3/19 s. creat 2.01, \par 20 POCT A1c 7.1%, A1c 7.0%, TSH 4.19, Free T4 1.1, s. 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 Pt received Sandostatin, Everolimus, 5FU.  \par PSHx: L lung (), L magnus- thyroidectomy (in ), heart (), gallbladder (), kidney stones, hysterectomy b/l GAURAV (19),  x2 ( and ), laparoscopy \par FHx: DM (mother, sister), CA (father, grandmother), heart disease (mother) \par SHx: former smoker (quit in ), no etOH use; works as a \par Checks BS sporadically\par Last funduscopic visit: in 2020; pt notes they detected a tumor in her eyes previously and she reports her tumor shrunk at her last visit.\par Drug Allergies: PCN\par Follows with nephrologist, oncologist (every month)\par \par Pt states she had a hemithyroidectomy in .\par She notes she was on Synthroid since then.\par In 2017 after chemotherapy, pt states her sugar levels started increasing. She states her last chemotherapy was in 2018.\par Abnormal kidney function in 2018. Pt notes Dr. Hutton DC/ed Metformin and switched pt from Atenolol to Metoprolol. \par \par 20\par Pt states her kidney function was detected while chemo studies were being done in ~.\par \par Today pt presents for endocrine f/u with POCT 230, feeling well with c/o nocturia 4x. Pt notes she broke her foot in 10/2019 and was unable to perform physical activities 2/2 this. \par Pt lost 6lbs since her last visit here.\par Denies pins and needle sensation in lower extremities. \par \par 20\par Today pt presents for endocrine f/u with POCT 122, feeling well. Review of BS readings show FBS are in target from 127-160\par Pt notes she has been taking Somatuline 1 dose per month since last year and that her last chemo therapy was in 2018.\par Denies blurry vision.\par \par 20\par Deterioration of kidney function noticed in 2018. \par \par Today pt presents for endocrine f/u with POCT 192, feeling well with c/o some difficulties sleeping. \par Pt states FBS of 154. \par She notes post prandial BS less than 180\par Pt states she takes "120 something mg" of Somatuline injections for the past 1 yr and 1/2. \par Denies diarrhea, hot flashes, palpitations, and muscle weakness. \par \par Current Mediations: Tradjenta 5mg QD (started on 20), Synthroid 50mcg, Simvastatin 20mg, Metoprolol 25mg, Somatuline (Lanreotide; rx by her Oncologist), Vit D, Prilosec 10mg, ASA 81 mg, Potassium

## 2020-07-31 NOTE — PHYSICAL EXAM
[Alert] : alert [Normal Sclera/Conjunctiva] : normal sclera/conjunctiva [Normal Outer Ear/Nose] : the ears and nose were normal in appearance [Normal S1, S2] : normal S1 and S2 [Clear to Auscultation] : lungs were clear to auscultation bilaterally [Normal Rate] : heart rate was normal [Normal Bowel Sounds] : normal bowel sounds [No Edema] : no peripheral edema [Spine Straight] : spine straight [No Rash] : no rash [Normal Gait] : normal gait [Left Foot Was Examined] : left foot ~C was examined [Right Foot Was Examined] : right foot ~C was examined [2+] : 2+ in the dorsalis pedis [Normal Sensation on Monofilament Testing] : normal sensation on monofilament testing of lower extremities [Oriented x3] : oriented to person, place, and time [de-identified] : R thyroid lobe palpated

## 2020-07-31 NOTE — CONSULT LETTER
[Dear  ___] : Dear  [unfilled], [Consult Letter:] : I had the pleasure of evaluating your patient, [unfilled]. [FreeTextEntry3] : Mike Zapata [Consult Closing:] : Thank you very much for allowing me to participate in the care of this patient.  If you have any questions, please do not hesitate to contact me.

## 2020-07-31 NOTE — ADDENDUM
[FreeTextEntry1] : I, Alana Duncan, acted solely as a scribe for Dr. Mike Zapata on this date. 07/28/2020.

## 2020-08-21 LAB
ALBUMIN SERPL ELPH-MCNC: 4.2 G/DL
ALP BLD-CCNC: 213 U/L
ALT SERPL-CCNC: 17 U/L
ANION GAP SERPL CALC-SCNC: 14 MMOL/L
AST SERPL-CCNC: 16 U/L
BILIRUB SERPL-MCNC: 0.4 MG/DL
BUN SERPL-MCNC: 22 MG/DL
CALCIUM SERPL-MCNC: 9.5 MG/DL
CHLORIDE SERPL-SCNC: 107 MMOL/L
CHOLEST SERPL-MCNC: 144 MG/DL
CHOLEST/HDLC SERPL: 2.1 RATIO
CO2 SERPL-SCNC: 17 MMOL/L
CREAT SERPL-MCNC: 1.81 MG/DL
CREAT SPEC-SCNC: 137 MG/DL
ESTIMATED AVERAGE GLUCOSE: 148 MG/DL
GLUCOSE BLDC GLUCOMTR-MCNC: 192
GLUCOSE SERPL-MCNC: 199 MG/DL
HBA1C MFR BLD HPLC: 6.8 %
HDLC SERPL-MCNC: 69 MG/DL
LDLC SERPL CALC-MCNC: 45 MG/DL
MICROALBUMIN 24H UR DL<=1MG/L-MCNC: 10.3 MG/DL
MICROALBUMIN/CREAT 24H UR-RTO: 75 MG/G
POTASSIUM SERPL-SCNC: 3.8 MMOL/L
PROT SERPL-MCNC: 6.9 G/DL
SODIUM SERPL-SCNC: 138 MMOL/L
T4 FREE SERPL-MCNC: 1.1 NG/DL
TRIGL SERPL-MCNC: 155 MG/DL
TSH SERPL-ACNC: 3.66 UIU/ML

## 2020-10-05 ENCOUNTER — APPOINTMENT (OUTPATIENT)
Dept: NEPHROLOGY | Facility: CLINIC | Age: 60
End: 2020-10-05
Payer: MEDICARE

## 2020-10-05 PROCEDURE — 99214 OFFICE O/P EST MOD 30 MIN: CPT | Mod: 95

## 2020-10-05 NOTE — ASSESSMENT
[FreeTextEntry1] : # CKD stage 3.\par * The patient has been counseled that chronic kidney disease is a significant condition and regular office followup with me (at least every 4 months for now) is essential for monitoring, and that it is their responsibility to make a follow up appointment.\par * A counseling information sheet has been given (currently or previously, in-person or electronically). All their questions were answered.\par * The patient has been counseled never to stop taking their medications without discussing it with me or another doctor.\par * The patient has been counseled on risk of acute renal failure and instructed to immediately call and speak with me or go immediately to ER with any severe symptoms, nausea, vomiting, diarrhea, chest pain, or shortness of breath.\par * The patient has been counseled on avoiding NSAIDs.\par \par # Hypokalemia.\par * Continue potassium chloride.\par \par # Metabolic acidosis.\par * Continue baking soda.\par \par # Diabetes.\par * Follow up with endo.\par \par # HTN controlled.\par * Cont metoprolol (reportedly required for CAD).\par * Her BP is 110s. Uncertain whether benefits of adding ARB may outweigh risks.\par * A counseling information sheet has been given (currently or previously, in-person or electronically). All their questions were answered.\par * The patient has been counseled to check their BP at home with an automatic arm cuff, write down the readings, and reach me directly on the phone immediately if they are persistently > 180 systolic or if SBP is less than 100 or if lightheadedness develops. They were counseled to bring in all blood pressure readings and medications next visit.\par * The patient has been counseled that they have a a significant medical condition and regular office followup (at least every 4 months for now) is essential for monitoring, and that it is their responsibility to make follow up appointments.\par * The patient also has been counseled that they must never stop or change any medications without discussing this with me (or another physician). \par

## 2020-10-05 NOTE — HISTORY OF PRESENT ILLNESS
[Home] : at home, [unfilled] , at the time of the visit. [Other Location: e.g. Home (Enter Location, City,State)___] : at [unfilled] [Verbal consent obtained from patient] : the patient, [unfilled] [FreeTextEntry1] : Kindly referred by Dr. Manuel Arevalo on 31Jan19 for decreased kidney function. \par \par * CKD stable, creatinine 1.81. Hypokalemia improved, K increased to 3.8. * Sodium bicarb restarted for bicarb of 17. * DM controlled, HGA1c 6.8. * Albuminuria decreased to 75. Labs checked by Dr. Arevalo in September. She will obtain labs. Reportedly labs were stable. * No symptoms of kidney stones. \par \par Previous history (28May20): * CKD previously stable, creatinine 1.97 in 1/20. * K-dur increased to 10 bid for K of 3.5. * Sodium bicarb (1/2 tsp TIW) restarted for HCO3 of 19. * Labs checked by Dr. Arevalo today. \par \par Previous history (10Jan20): * CKD stable. * Slight hypokalemia. * HCO3 19. She has not taken sodium bicarbonate. \par \par Previous history (07Oct19): * CKD stable, creatinine 2.13. * K 3.4. K-dur 10 meq started last visit. * Labs checked 1 week ago by Dr. Arevalo. (The patient has been advised to call their office and arrange copies of notes/labs to be sent.) * She is only taking sodium bicarbonate three times weekly. \par \par Previous history (27Aug19): * CKD stable, creatinine 2.13 in August 2019. * Slight hypokalemia, K 3.4. No diuretics, vomiting, or diarrhea. * HCO3 increased to 18 on sodium bicarbonate but she has taken this only three times weekly. \par \par Previous history (28May19): * S/P hysterectomy and bilateral GAURAV. 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 * CKD previous stable, creatinine 2.21. * K decreased to 2.9. Repeat K was improved in ED. She has increased in potassium in her diet. * Oral bicarb started for acidosis. (1 tsp)\par \par Previous history (26Mar19): * Progressive CKD. Creatinine 2.18. * HCO3 15 previously. Baking soda 650 bid started.  * HTN controlled. No lightheadedness. Compliant with medications. Following low salt diet. * Off metformin. * 1.6 g/g proteinuria. \par \par Previous history (20Feb19): * HTN controlled, 120s - 130s at home. No lightheadedness. Compliant with medications. Following low salt diet. * Proteinuria 1.6 g/g. *  Metformin was stopped last visit. Nausea resolved.* CKD stable, creatinine 2.1. * HCO3 15. She has not started baking soda as instructed. 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 She has never had flushing or carcinoid syndrome. Since 2013, she has been treated with monthly sandostatin and in 2014 was treated with everolimus (afinitor). In 2017, she was treated with 5FU (Nov 17 - July 18) and another type of chemotherapy, though she is not on this currently. She was diagnosed with diabetes in 2017 and started on metformin, which she is still taking. In 2017 she was also started on lisinopril by her cardiologist "to protect her kidneys". She first became aware of decreased kidney function in about November of 2018. She is uncertain what her creatinine is but believes her kidney function is 17 percent. (See below.) Labs have been requested and are currently pending. A renal ultrasound on 12/3/18 revealed 9.5/9.8 kidneys which were echogenic. There was no hydronephrosis. The patient denies exposure to chronic NSAIDs, phosphate bowel preparations, PPIs, green smoothies, creatine, or herbal supplements.\par \par One episode of kidney stone which passed spontaneously in 2004. \par \par ADDENDUM: Creatinine 2.07, GFR 25 on labs from 1/7/19. Per discussion with Dr. Arevalo, her creatinine has increased in May 2018. It has been stable since November 2018. \par \par \par

## 2020-12-21 PROBLEM — B37.3 VULVAR CANDIDIASIS: Status: RESOLVED | Noted: 2019-04-26 | Resolved: 2020-12-21

## 2021-01-22 ENCOUNTER — LABORATORY RESULT (OUTPATIENT)
Age: 61
End: 2021-01-22

## 2021-01-22 ENCOUNTER — APPOINTMENT (OUTPATIENT)
Dept: NEPHROLOGY | Facility: CLINIC | Age: 61
End: 2021-01-22
Payer: MEDICARE

## 2021-01-22 VITALS — WEIGHT: 178.4 LBS | BODY MASS INDEX: 31.6 KG/M2 | TEMPERATURE: 97.5 F

## 2021-01-22 VITALS — SYSTOLIC BLOOD PRESSURE: 114 MMHG | DIASTOLIC BLOOD PRESSURE: 73 MMHG | HEART RATE: 79 BPM

## 2021-01-22 DIAGNOSIS — E87.2 ACIDOSIS: ICD-10-CM

## 2021-01-22 PROCEDURE — 99214 OFFICE O/P EST MOD 30 MIN: CPT | Mod: 25

## 2021-01-22 PROCEDURE — 36415 COLL VENOUS BLD VENIPUNCTURE: CPT

## 2021-01-22 NOTE — PHYSICAL EXAM
[General Appearance - Alert] : alert [General Appearance - In No Acute Distress] : in no acute distress [Heart Rate And Rhythm] : heart rate was normal and rhythm regular [Auscultation Breath Sounds / Voice Sounds] : lungs were clear to auscultation bilaterally [Heart Sounds] : normal S1 and S2 [Heart Sounds Gallop] : no gallops [Murmurs] : no murmurs [Heart Sounds Pericardial Friction Rub] : no pericardial rub [Edema] : there was no peripheral edema [Bowel Sounds] : normal bowel sounds [Abdomen Soft] : soft [Abdomen Tenderness] : non-tender [] : no hepato-splenomegaly [Abdomen Mass (___ Cm)] : no abdominal mass palpated

## 2021-01-22 NOTE — CONSULT LETTER
[FreeTextEntry1] : Dear Dr. Arevalo,\par \par I had the pleasure of seeing Kati Alvarez in followup for kidney disease. My note is attached.\par \par Thank you for the opportunity to participate in the care of your patient.\par \par Please call me on my cell phone at [phone] or in the office at [phone] if you have any questions.\par \par Best regards,\par \par Osbaldo\par \par Osbaldo Hutton MD, FACP, FASN\par [address], [place]\par [email or web address]\par Office: [phone]\par Mobile: [phone]

## 2021-01-22 NOTE — ASSESSMENT
[FreeTextEntry1] : # CKD stage 3 c/w DM, HTN. \par * Chemotherapy is unlikely to have contributed to CKD. \par * Recheck urine tests. \par * Will consider SGLT2 inhibitor or adding ARB. \par * [employer] CKD genetic panel ordered. Informed consent given ([email or web address] * The patient has been counseled that chronic kidney disease is a significant condition and regular office followup with me (at least every 2 - 3 months for now) is essential for monitoring, and that it is their responsibility to make a follow up appointment.\par * A counseling information sheet has been given (currently or previously, in-person or electronically). All their questions were answered.\par * The patient has been counseled never to stop taking their medications without discussing it with me or another doctor.\par * The patient has been counseled on risk of acute renal failure and instructed to immediately call and speak with me or go immediately to ER with any severe symptoms, nausea, vomiting, diarrhea, chest pain, or shortness of breath.\par * The patient has been counseled on avoiding NSAIDs.\par \par # Hypokalemia.\par * Continue potassium chloride.\par \par # Metabolic acidosis.\par * Continue baking soda.\par \par # Diabetes.\par * Follow up with endo.\par \par # HTN controlled.\par * Cont metoprolol (reportedly required for CAD).\par * A counseling information sheet has been given (currently or previously, in-person or electronically). All their questions were answered.\par * The patient has been counseled to check their BP at home with an automatic arm cuff, write down the readings, and reach me directly on the phone immediately if they are persistently > 180 systolic or if SBP is less than 100 or if lightheadedness develops. They were counseled to bring in all blood pressure readings and medications next visit.\par * The patient has been counseled that they have a a significant medical condition and regular office followup (at least every 2-3 months for now) is essential for monitoring, and that it is their responsibility to make follow up appointments.\par * The patient also has been counseled that they must never stop or change any medications without discussing this with me (or another physician). \par \par # Back pain.\par * Rare use of Aleve is acceptable with CKD based on recent studies.\par * Ortho referral. \par \par

## 2021-01-22 NOTE — HISTORY OF PRESENT ILLNESS
[FreeTextEntry1] : Kindly referred by Dr. Manuel Arevalo on 31Jan19 for decreased kidney function. \par \par * CKD stable, creatinine 1.69 (eGFR 32). 1 epsiode of hematuria. Mother may have had kidney disease. * Chronic back pain for 1 month. No neurologic symptoms. Following up with Dr. Arevalo. No numbness, weakness, CP or SOB. * No symptoms of kidney stones. Renal US 2018. \par \par * CKD stable, creatinine 1.81. Hypokalemia improved, K increased to 3.8. * Sodium bicarb restarted for bicarb of 17. * DM controlled, HGA1c 6.8. * Albuminuria decreased to 75. Labs checked by Dr. Arevalo in September. She will obtain labs. Reportedly labs were stable. * No symptoms of kidney stones. \par \par Previous history (28May20): * CKD previously stable, creatinine 1.97 in 1/20. * K-dur increased to 10 bid for K of 3.5. * Sodium bicarb (1/2 tsp TIW) restarted for HCO3 of 19. * Labs checked by Dr. Arevalo today. \par \par Previous history (10Jan20): * CKD stable. * Slight hypokalemia. * HCO3 19. She has not taken sodium bicarbonate. \par \par Previous history (07Oct19): * CKD stable, creatinine 2.13. * K 3.4. K-dur 10 meq started last visit. * Labs checked 1 week ago by Dr. Arevalo. (The patient has been advised to call their office and arrange copies of notes/labs to be sent.) * She is only taking sodium bicarbonate three times weekly. \par \par Previous history (27Aug19): * CKD stable, creatinine 2.13 in August 2019. * Slight hypokalemia, K 3.4. No diuretics, vomiting, or diarrhea. * HCO3 increased to 18 on sodium bicarbonate but she has taken this only three times weekly. \par \par Previous history (28May19): * S/P hysterectomy and bilateral GAURAV. 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 * CKD previous stable, creatinine 2.21. * K decreased to 2.9. Repeat K was improved in ED. She has increased in potassium in her diet. * Oral bicarb started for acidosis. (1 tsp)\par \par Previous history (26Mar19): * Progressive CKD. Creatinine 2.18. * HCO3 15 previously. Baking soda 650 bid started.  * HTN controlled. No lightheadedness. Compliant with medications. Following low salt diet. * Off metformin. * 1.6 g/g proteinuria. \par \par Previous history (20Feb19): * HTN controlled, 120s - 130s at home. No lightheadedness. Compliant with medications. Following low salt diet. * Proteinuria 1.6 g/g. *  Metformin was stopped last visit. Nausea resolved.* CKD stable, creatinine 2.1. * HCO3 15. She has not started baking soda as instructed. 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 She has never had flushing or carcinoid syndrome. Since 2013, she has been treated with monthly sandostatin and in 2014 was treated with everolimus (afinitor). In 2017, she was treated with 5FU (Nov 17 - July 18) and another type of chemotherapy, though she is not on this currently. She was diagnosed with diabetes in 2017 and started on metformin, which she is still taking. In 2017 she was also started on lisinopril by her cardiologist "to protect her kidneys". She first became aware of decreased kidney function in about November of 2018. She is uncertain what her creatinine is but believes her kidney function is 17 percent. (See below.) Labs have been requested and are currently pending. A renal ultrasound on 12/3/18 revealed 9.5/9.8 kidneys which were echogenic. There was no hydronephrosis. The patient denies exposure to chronic NSAIDs, phosphate bowel preparations, PPIs, green smoothies, creatine, or herbal supplements.\par \par One episode of kidney stone which passed spontaneously in 2004. \par \par ADDENDUM: Creatinine 2.07, GFR 25 on labs from 1/7/19. Per discussion with Dr. Arevalo, her creatinine has increased in May 2018. It has been stable since November 2018. \par \par \par

## 2021-01-26 ENCOUNTER — OUTPATIENT (OUTPATIENT)
Dept: OUTPATIENT SERVICES | Facility: HOSPITAL | Age: 61
LOS: 1 days | End: 2021-01-26
Payer: MEDICARE

## 2021-01-26 ENCOUNTER — RESULT REVIEW (OUTPATIENT)
Age: 61
End: 2021-01-26

## 2021-01-26 ENCOUNTER — APPOINTMENT (OUTPATIENT)
Dept: ORTHOPEDIC SURGERY | Facility: CLINIC | Age: 61
End: 2021-01-26
Payer: MEDICARE

## 2021-01-26 VITALS
BODY MASS INDEX: 31.54 KG/M2 | SYSTOLIC BLOOD PRESSURE: 110 MMHG | TEMPERATURE: 97.5 F | WEIGHT: 178 LBS | HEART RATE: 94 BPM | OXYGEN SATURATION: 98 % | DIASTOLIC BLOOD PRESSURE: 70 MMHG | HEIGHT: 63 IN

## 2021-01-26 DIAGNOSIS — M54.42 LUMBAGO WITH SCIATICA, LEFT SIDE: ICD-10-CM

## 2021-01-26 DIAGNOSIS — Z87.442 PERSONAL HISTORY OF URINARY CALCULI: Chronic | ICD-10-CM

## 2021-01-26 DIAGNOSIS — Z90.49 ACQUIRED ABSENCE OF OTHER SPECIFIED PARTS OF DIGESTIVE TRACT: Chronic | ICD-10-CM

## 2021-01-26 DIAGNOSIS — Z90.2 ACQUIRED ABSENCE OF LUNG [PART OF]: Chronic | ICD-10-CM

## 2021-01-26 DIAGNOSIS — Z41.9 ENCOUNTER FOR PROCEDURE FOR PURPOSES OTHER THAN REMEDYING HEALTH STATE, UNSPECIFIED: Chronic | ICD-10-CM

## 2021-01-26 DIAGNOSIS — Z98.890 OTHER SPECIFIED POSTPROCEDURAL STATES: Chronic | ICD-10-CM

## 2021-01-26 DIAGNOSIS — Z98.891 HISTORY OF UTERINE SCAR FROM PREVIOUS SURGERY: Chronic | ICD-10-CM

## 2021-01-26 DIAGNOSIS — M51.36 OTHER INTERVERTEBRAL DISC DEGENERATION, LUMBAR REGION: ICD-10-CM

## 2021-01-26 PROCEDURE — 72110 X-RAY EXAM L-2 SPINE 4/>VWS: CPT

## 2021-01-26 PROCEDURE — 72110 X-RAY EXAM L-2 SPINE 4/>VWS: CPT | Mod: 26

## 2021-01-26 PROCEDURE — 99203 OFFICE O/P NEW LOW 30 MIN: CPT

## 2021-01-26 NOTE — DISCUSSION/SUMMARY
[Medication Risks Reviewed] : Medication risks reviewed [de-identified] : The patient is a 60 year old woman with history of metastatic carcinoid, HTN, DM presenting a two week history of progressively worsening, left-sided low back pain with radiation down left posterolateral buttock and thigh.  Cannot rule out acute radiculopathy.\par \par Imaging/Diagnostics/Medical Records were interpreted and/or reviewed and results were reviewed with the patient in detail.  All questions were answered appropriately.\par \par \par I discussed the treatment of low back pain with the patient at length today. I described the spectrum of treatment from nonoperative modalities to surgery. Noninvasive and nonoperative treatment modalities include relative rest, weight reduction, activity modification, PRN use of acetaminophen/ anti-inflammatory medication if tolerated, corticosteroids if indicated, home exercise program and physical therapy.  We also discussed adjunctive treatment including heat therapy, accupuncture, manual therapy, TENS unit.  Further treatments can include interventional spine procedures such as epidural injections, and surgical consultation.\par \par MRI of the lumbar spine ordered today.\par \par Patient was prescribed a short course of Tramadol.  Prior to prescribing, history of the patient's scheduled medication use was reviewed utilizing the I:STOP [place]  aware program.  Appropriate use of medication was discussed with the patient in detail.  The risks, benefits, adverse effects, alternative options were discussed.  The patient expressed understanding.\par \par Patient was prescribed a short course of Flexeril.  Appropriate use of medication was reviewed with the patient in detail. Risks, benefits, and adverse effects medication were discussed.\par \par Avoid NSAIDs given CKD.  Avoid PO steroids for now given immunocompromised status.  Patient unable to get contrast studies because of underlying kidney disease.\par \par Follow-up after MRI.\par \par ------------------------------------------------------------------------------------------------------------------\par Patient appreciates and agrees with current plan.\par \par The patient's diagnosis above was evaluated by me, personally.  Diagnostic Testing and treatment options were discussed with the patient in detail.  The risks/benefits/potential complications of diagnostic testing/treatments were described in detail.  \par \par This note was generated using a mixture of manual typing and dragon medical dictation software.  A reasonable effort has been made for proofreading its contents, but typos may still remain.  If there are any questions or points of clarification needed please notify my office.\par \par \par >30 minutes of time was spent on total encounter.  >50% of the visit was spent on face-to-face counseling/coordination of care and medical-decision making for this patient.\par \par

## 2021-01-26 NOTE — PHYSICAL EXAM
[de-identified] : General: Well-nourished, well-developed, alert, and in no acute distress.\par Head: Normocephalic.\par Eyes: Pupils equal round reactive to light and accommodation, extraocular muscles intact, normal sclera.\par Nose: No nasal discharge.\par Cardiac: Regular rate. Extremities are warm and well perfused. Distal pulses are symmetric bilaterally.\par Respiratory: No labored breathing.\par Extremities: Sensation is intact distally bilaterally.  Distal pulses are symmetric bilaterally\par Lymphatic: No regional lymphadenopathy, no lymphedema\par Neurologic: No focal deficits\par Skin: Normal skin color, texture, and turgor\par Psychiatric: Normal affect\par MSK: as noted above/below\par \par \par \par Low Back:\par \par Inspection:\par ·	Alignment: MILD STRAIGHTENING OF LUMBAR LORDOSIS, MINIMAL LEVOSCOLIOSIS\par ·	No scars\par \par Palpation:   \par ·	SI  Joints:  LEFT>RIGHT pain\par ·	Iliolumbar ligaments    LEFT>RIGHT pain\par ·	Quadratus lumborum   LEFT> RIGHT pain\par ·	Interspinous ligament  MODERATE pain L3-S1 REGION\par ·	Greater trochanter  NO pain\par ·	Mid thoracic spine: NO  pain\par \par \par ROM: \par ·	 Flexion:  30 degrees, WITH DISCOMFORT\par ·	Extension:  5-10 degrees WITH PAIN\par ·	Side Bending:  Full, WITH PAIN LEFT>GREAT\par PAIN WITH FACET LOADING\par \par \par Strength: \par ·	Core:  Trendelenburg: FAIR\par ·	Hip / Leg Flexion, Extension  5/5\par ·	Foot Eversion/ Inversion:   5/5\par ·	Calf:   5/5\par ·	EHL:  5/5\par \par NEURO  \par ·	Sensation:   Intact throughout the lower extremity\par ·	DTR's:  Symmetric throughout the lower extremity.  \par ·	Upper Motor Neuron:\par                 Moe Test: NEGATIVE\par                 Romberg Test: NEGATIVE\par                 Clonus: NEGATIVE\par \par Other tests: \par ·	Slump test: POSITIVE ON LEFT\par ·	Straight leg raise test:   POSITIVE ON LEFT\par Vascular:  \par ·	No cyanosis, clubbing, edema.  \par ·	Palpable dorsalis pedis.\par \par \par  [de-identified] : Xray Lumbosacral Spine - Multiple views were reviewed with the patient in detail.  There is mild levoscoliosis..  There is no acute fracture.  There does not appear to be instability with flexion/extension.  Vertebral heights  appear preserved.  L4-L5 disc space narrowing.  We will await formal radiology reading.\par \par \par

## 2021-01-26 NOTE — HISTORY OF PRESENT ILLNESS
[de-identified] : The patient is a 60 year old woman with history of metastatic carcinoid, HTN, DM presenting with low back pain\par \par The patient presents with a two week history of progressively worsening, left-sided low back pain with radiation down left posterolateral buttock and thigh.  The patient denies precipitating injury or trauma.  She denies previous back pain history.  No previous imaging.  The patient denies red flag symptoms including fever, chills, weight loss, night sweats, bowel/bladder dysfunction, saddle anesthesia.  Pain has been worse with sit-to-stand and with lying supine.  She tried Tylenol with minimal relief of symptoms.  She cannot take NSAIDs because a history of CKD.  The patient denies constitutional symptoms including fever, chills, malaise, weight loss, night sweats.\par \par Pain is rated 8/10, described as sharp/shooting, improved with rest/heat, worse with walking/trunk flexion. [8] : a current pain level of 8/10

## 2021-01-27 ENCOUNTER — TRANSCRIPTION ENCOUNTER (OUTPATIENT)
Age: 61
End: 2021-01-27

## 2021-01-29 LAB
APPEARANCE: CLEAR
BILIRUBIN URINE: NEGATIVE
BLOOD URINE: ABNORMAL
COLOR: NORMAL
CREAT SPEC-SCNC: 107 MG/DL
GLUCOSE QUALITATIVE U: ABNORMAL
KETONES URINE: NEGATIVE
LEUKOCYTE ESTERASE URINE: ABNORMAL
MICROALBUMIN 24H UR DL<=1MG/L-MCNC: 8.4 MG/DL
MICROALBUMIN/CREAT 24H UR-RTO: 78 MG/G
NITRITE URINE: NEGATIVE
PH URINE: 6
PROTEIN URINE: ABNORMAL
SPECIFIC GRAVITY URINE: 1.02
UROBILINOGEN URINE: NORMAL

## 2021-02-04 ENCOUNTER — NON-APPOINTMENT (OUTPATIENT)
Age: 61
End: 2021-02-04

## 2021-02-05 DIAGNOSIS — M51.36 OTHER INTERVERTEBRAL DISC DEGENERATION, LUMBAR REGION: ICD-10-CM

## 2021-02-05 DIAGNOSIS — M54.16 RADICULOPATHY, LUMBAR REGION: ICD-10-CM

## 2021-02-23 ENCOUNTER — APPOINTMENT (OUTPATIENT)
Dept: ENDOCRINOLOGY | Facility: CLINIC | Age: 61
End: 2021-02-23

## 2021-03-11 ENCOUNTER — APPOINTMENT (OUTPATIENT)
Dept: ORTHOPEDIC SURGERY | Facility: CLINIC | Age: 61
End: 2021-03-11
Payer: MEDICARE

## 2021-03-11 ENCOUNTER — RESULT REVIEW (OUTPATIENT)
Age: 61
End: 2021-03-11

## 2021-03-11 ENCOUNTER — OUTPATIENT (OUTPATIENT)
Dept: OUTPATIENT SERVICES | Facility: HOSPITAL | Age: 61
LOS: 1 days | End: 2021-03-11
Payer: MEDICARE

## 2021-03-11 VITALS — TEMPERATURE: 97.5 F

## 2021-03-11 DIAGNOSIS — Z90.49 ACQUIRED ABSENCE OF OTHER SPECIFIED PARTS OF DIGESTIVE TRACT: Chronic | ICD-10-CM

## 2021-03-11 DIAGNOSIS — Z87.442 PERSONAL HISTORY OF URINARY CALCULI: Chronic | ICD-10-CM

## 2021-03-11 DIAGNOSIS — Z90.2 ACQUIRED ABSENCE OF LUNG [PART OF]: Chronic | ICD-10-CM

## 2021-03-11 DIAGNOSIS — Z98.891 HISTORY OF UTERINE SCAR FROM PREVIOUS SURGERY: Chronic | ICD-10-CM

## 2021-03-11 DIAGNOSIS — Z98.890 OTHER SPECIFIED POSTPROCEDURAL STATES: Chronic | ICD-10-CM

## 2021-03-11 DIAGNOSIS — Z41.9 ENCOUNTER FOR PROCEDURE FOR PURPOSES OTHER THAN REMEDYING HEALTH STATE, UNSPECIFIED: Chronic | ICD-10-CM

## 2021-03-11 DIAGNOSIS — M75.02 ADHESIVE CAPSULITIS OF LEFT SHOULDER: ICD-10-CM

## 2021-03-11 PROCEDURE — 73030 X-RAY EXAM OF SHOULDER: CPT | Mod: 26,LT

## 2021-03-11 PROCEDURE — 99212 OFFICE O/P EST SF 10 MIN: CPT

## 2021-03-11 PROCEDURE — 73030 X-RAY EXAM OF SHOULDER: CPT

## 2021-03-11 RX ORDER — OXYCODONE AND ACETAMINOPHEN 5; 325 MG/1; MG/1
5-325 TABLET ORAL
Qty: 15 | Refills: 0 | Status: DISCONTINUED | COMMUNITY
Start: 2019-04-17 | End: 2021-03-11

## 2021-03-11 NOTE — DISCUSSION/SUMMARY
[Medication Risks Reviewed] : Medication risks reviewed [de-identified] : The patient is a 60 year old, RHD woman with history of metastatic carcinoid, HTN, DM presenting with chronic, progressive left shoulder pain and stiffness likely secondary to adhesive capsulitis.\par \par Imaging/Diagnostics/Medical Records were interpreted and/or reviewed and results were reviewed with the patient in detail.  All questions were answered appropriately.\par \par The patient was counseled on the natural progression of the problem(s) today and potential complications of diagnoses.  The patient was provided reassurance today.\par \par Patient was prescribed a course of physical therapy today.  The patient was also provided some general home exercises.  The patient was counseled on activity modification.\par \par Consider corticosteroid injection, pending recommendations from her Endocrinologist.\par \par For now, Tylenol as needed for pain.  Topical ointments such as biofreeze, arnica, voltaren gel.\par \par Follow-up in 4-6 weeks.\par \par ------------------------------------------------------------------------------------------------------------------\par Patient appreciates and agrees with current plan.\par \par The patient's diagnosis above was evaluated by me, personally.  Diagnostic Testing and treatment options were discussed with the patient in detail.  The risks/benefits/potential complications of diagnostic testing/treatments were described in detail.  \par \par This note was generated using a mixture of manual typing and dragon medical dictation software.  A reasonable effort has been made for proofreading its contents, but typos may still remain.  If there are any questions or points of clarification needed please notify my office.\par \par \par >15 minutes of time was spent in total for the encounter.  >50% of the time spent was on face-to-face counseling/coordination of care and medical-decision making for this patient.\par

## 2021-03-11 NOTE — HISTORY OF PRESENT ILLNESS
[de-identified] : The patient is a 60 year old, RHD woman with history of metastatic carcinoid, HTN, DM presenting with left shoulder pain\par \par The patient was previously seen for low back pain which has resolved.\par \par She complains of a several month history of progressive, atraumatic, left shoulder pain and stiffness.  She has a history of contralateral frozen shoulder which resolved after a long period.  The patient denies precipitating injury or trauma.  She denies focal neck pain.  Pain radiates to lateral shoulder.  She endorses mild pain when sleeping on the affected side.\par \par Pain is moderate in intensity, described as achy, improved with rest, worse with arm movement.\par \par

## 2021-03-11 NOTE — PHYSICAL EXAM
[de-identified] : General: Well-nourished, well-developed, alert, and in no acute distress.\par Head: Normocephalic.\par Eyes: Pupils equal round reactive to light and accommodation, extraocular muscles intact, normal sclera.\par Nose: No nasal discharge.\par Cardiac: Regular rate. Extremities are warm and well perfused. Distal pulses are symmetric bilaterally.\par Respiratory: No labored breathing.\par Extremities: Sensation is intact distally bilaterally.  Distal pulses are symmetric bilaterally\par Lymphatic: No regional lymphadenopathy, no lymphedema\par Neurologic: No focal deficits\par Skin: Normal skin color, texture, and turgor\par Psychiatric: Normal affect\par MSK: as noted above/below\par \par \par \par RIGHT SHOULDER:\par  \par Inspection:no bruising, rash, erythema, deformity\par Joint Effusion:no\par ROM:normal Forward Flexion, Abduction , Internal Rotation: , External Rotation \par Palpation: NO AC joint pain, Bicipital Groove Pain , GH joint pain , Clavicle pain , GT pain \par Distal Pulses:normal\par Upper Extremity Reflexes:2+\par Shoulder Strength: 5/5 \par Upper Extremity Sensation: normal\par \par Special Tests:\par Empty Can: Negative \par Lift-Off Test: Negative \par O'Briens: Negative\par Cross Arm: Negative\par Neer: Negative\par Mane: Negative\par \par LEFT SHOULDER:\par  \par Inspection:no bruising, rash, erythema, deformity\par Joint Effusion:no\par ROM: DECREASED IN ALL PLANES FF/ABDUCTION ~90, ER ~45, IR TO SACRUM\par Palpation: PAIN AT GH JOINTNO AC joint pain, Bicipital Groove Pain , GH joint pain , Clavicle pain , GT pain \par Distal Pulses:normal\par Upper Extremity Reflexes:2+\par Shoulder Strength: 5-/5 \par Upper Extremity Sensation: normal\par \par Special Tests:\par Empty Can: PAINFUL \par Cross Arm: POSITIVE\par Neer: POSITIVE\par Mane: POSITIVE\par Speed: Negative\par Apprehension:Negative\par \par  [de-identified] : Xray LEFT  Shoulder - Multiple views were reviewed with the patient in detail. \par \par \par There is no fracture or dislocation. The joint spaces and alignment are preserved. There is no focal soft tissue abnormality.

## 2021-04-18 ENCOUNTER — RX RENEWAL (OUTPATIENT)
Age: 61
End: 2021-04-18

## 2021-05-12 ENCOUNTER — RX RENEWAL (OUTPATIENT)
Age: 61
End: 2021-05-12

## 2021-07-16 NOTE — ED ADULT NURSE NOTE - CAS ELECT INFOMATION PROVIDED
Medicare Wellness Visit  Plan for Preventive Care    A good way for you to stay healthy is to use preventive care.  Medicare covers many services that can help you stay healthy.* The goal of these services is to find any health problems as quickly as possible. Finding problems early can help make them easier to treat.  Your personal plan below lists the services you may need and when they are due.     Health Maintenance Summary     COVID-19 Vaccine (1)  Overdue - never done    DTaP/Tdap/Td Vaccine (1 - Tdap)  Overdue - never done    Shingles Vaccine (1 of 2)  Overdue - never done    Diabetes Eye Exam (Yearly)  Overdue since 5/4/2019    Diabetes Foot Exam (Yearly)  Overdue since 10/4/2019    DM/CKD Microalbumin (Yearly)  Overdue since 10/5/2019    Medicare Wellness Visit (Yearly)  Overdue since 1/1/2021    Diabetes A1C (Every 6 Months)  Overdue since 3/14/2021    Influenza Vaccine (1)  Next due on 8/1/2021    DM/CKD GFR (Yearly)  Next due on 10/26/2021    Depression Screening (Yearly)  Next due on 7/16/2022    Pneumococcal Vaccine 65+   Completed    Hepatitis B Vaccine   Aged Out    Meningococcal Vaccine   Aged Out    HPV Vaccine   Aged Out           Preventive Care for Women and Men    Heart Screenings (Cardiovascular):  · Blood tests are used to check your cholesterol, lipid and triglyceride levels. High levels can increase your risk for heart disease and stroke. High levels can be treated with medications, diet and exercise. Lowering your levels can help keep your heart and blood vessels healthy.  Your provider will order these tests if they are needed.    · An ultrasound is done to see if you have an abdominal aortic aneurysm (AAA).  This is an enlargement of one of the main blood vessels that delivers blood to the body.   In the [place], 9,000 deaths are caused by AAA.  You may not even know you have this problem and as many as 1 in 3 people will have a serious problem if it is not treated.  Early  diagnosis allows for more effective treatment and cure.  If you have a family history of AAA or are a male age 65-75 who has smoked, you are at higher risk of an AAA.  Your provider can order this test, if needed.    Colorectal Screening:  · There are many tests that are used to check for cancer of your colon and rectum. You and your provider should discuss what test is best for you and when to have it done.  Options include:  · Screening Colonoscopy: exam of the entire colon, seen through a flexible lighted tube.  · Flexible Sigmoidoscopy: exam of the last third (sigmoid portion) of the colon and rectum, seen through a flexible lighted tube.  · Cologuard DNA stool test: a sample of your stool is used to screen for cancer and unseen blood in your stool.  · Fecal Occult Blood Test: a sample of your stool is studied to find any unseen blood    Flu Shot:  · An immunization that helps to prevent influenza (the flu). You should get this every year. The best time to get the shot is in the fall.    Pneumococcal Shot:  • Vaccines are available that can help prevent pneumococcal disease, which is any type of infection caused by Streptococcus pneumoniae bacteria.   Their use can prevent some cases of pneumonia, meningitis, and sepsis. There are two types of pneumococcal vaccines:   o Conjugate vaccines (PCV-13 or Prevnar 13®) - helps protect against the 13 types of pneumococcal bacteria that are the most common causes of serious infections in children and adults.    o Polysaccharide vaccine (PPSV23 or Yuibthwxi12®) - helps protect against 23 types of pneumococcal bacteria for patients who are recommended to get it.  These vaccines should be given at least 12 months apart.  A booster is usually not needed.     Hepatitis B Shot:  · An immunization that helps to protect people from getting Hepatitis B. Hepatitis B is a virus that spreads through contact with infected blood or body fluids. Many people with the virus do not have  symptoms.  The virus can lead to serious problems, such as liver disease. Some people are at higher risk than others. Your doctor will tell you if you need this shot.     Diabetes Screening:  · A test to measure sugar (glucose) in your blood is called a fasting blood sugar. Fasting means you cannot have food or drink for at least 8 hours before the test. This test can detect diabetes long before you may notice symptoms.    Glaucoma Screening:  · Glaucoma screening is performed by your eye doctor. The test measures the fluid pressure inside your eyes to determine if you have glaucoma.     Hepatitis C Screening:  · A blood test to see if you have the hepatitis C virus.  Hepatitis C attacks the liver and is a major cause of chronic liver disease.  Medicare will cover a single screening for all adults born between 1945 & 1965, or high risk patients (people who have injected illegal drugs or people who have had blood transfusions).  High risk patients who continue to inject illegal drugs can be screened for Hepatitis C every year.    Smoking and Tobacco-Use Cessation Counseling:  · Tobacco is the single greatest cause of disease and early death in our country today. Medication and counseling together can increase a person’s chance of quitting for good.   · Medicare covers two quitting attempts per year, with four counseling sessions per attempt (eight sessions in a 12 month period)    Preventive Screening tests for Women    Screening Mammograms and Breast Exams:  · An x-ray of your breasts to check for breast cancer before you or your doctor may be able to feel it.  If breast cancer is found early it can usually be treated with success.    Pelvic Exams and Pap Tests:  · An exam to check for cervical and vaginal cancer. A Pap test is a lab test in which cells are taken from your cervix and sent to the lab to look for signs of cervical cancer. If cancer of the cervix is found early, chances for a cure are good. Testing can  generally end at age 65, or if a woman has a hysterectomy for a benign condition. Your provider may recommend more frequent testing if certain abnormal results are found.    Bone Mass Measurements:  · A painless x-ray of your bone density to see if you are at risk for a broken bone. Bone density refers to the thickness of bones or how tightly the bone tissue is packed.    Preventive Screening tests for Men    Prostate Screening:  · Should you have a prostate cancer test (PSA)?  It is up to you to decide if you want a prostate cancer test. Talk to your clinician to find out if the test is right for you.  Things for you to consider and talk about should include:  · Benefits and harms of the test  · Your family history  · How your race/ethnicity may influence the test  · If the test may impact other medical conditions you have  · Your values on screenings and treatments    *Medicare pays for many preventive services to keep you healthy. For some of these services, you might have to pay a deductible, coinsurance, and / or copayment.  The amounts vary depending on the type of services you need and the kind of Medicare health plan you have.    For further details on screenings offered by Medicare please visit: https://www.medicare.gov/coverage/preventive-screening-services    DC instructions

## 2021-08-04 ENCOUNTER — NON-APPOINTMENT (OUTPATIENT)
Age: 61
End: 2021-08-04

## 2021-08-04 ENCOUNTER — APPOINTMENT (OUTPATIENT)
Dept: ENDOCRINOLOGY | Facility: CLINIC | Age: 61
End: 2021-08-04
Payer: MEDICARE

## 2021-08-04 VITALS
HEART RATE: 83 BPM | BODY MASS INDEX: 30.65 KG/M2 | SYSTOLIC BLOOD PRESSURE: 130 MMHG | DIASTOLIC BLOOD PRESSURE: 84 MMHG | WEIGHT: 173 LBS

## 2021-08-04 PROCEDURE — 83036 HEMOGLOBIN GLYCOSYLATED A1C: CPT | Mod: QW

## 2021-08-04 PROCEDURE — 99214 OFFICE O/P EST MOD 30 MIN: CPT | Mod: 25

## 2021-08-04 PROCEDURE — 82962 GLUCOSE BLOOD TEST: CPT

## 2021-08-06 NOTE — END OF VISIT
[FreeTextEntry3] : All medical record entries made by the Scribe were at my, Dr. Mike Zapata, direction and personally dictated by me on 08/04/2021. I have reviewed the chart and agree that the record accurately reflects my personal performance of the history, physical exam, assessment and plan. I have also personally directed, reviewed and agreed with the chart.  [Time Spent: ___ minutes] : I have spent [unfilled] minutes of time on the encounter.

## 2021-08-06 NOTE — HISTORY OF PRESENT ILLNESS
[FreeTextEntry1] : 60 y/o F pt, with Hx of hypothyroidism after L hemithyroidectomy in , and Hx of DM (dx in ) with DM related complications of CKD. 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 Pt never had symptoms of carcinoid syndrome. Pt received Sandostatin, Everolimus, 5FU)\par PSHx: L lung (), Heart (), Cholecystectomy (), Kidney stones, hysterectomy b/l GAURAV (19),  x2 ( &), Laparoscopy \par FHx: DM (mother, sister), CA (father, grandmother), heart disease (mother) \par SHx: Former smoker (quit in ). No etOH use. Works as a . \par Lifestyle: Walks a lot. Checks BS 2x a week. \par Last funduscopic visit: in 2020; pt notes previously detected tumor in her eyes have shrunk. \par Follows with nephrologist Dr. Hutton for CKD stage 3, hypokalemia, metabolic acidosis and HTN. \par \par 2021\par - 3/11/21 Orthopedic note review: chronic, progressive left shoulder pain and stiffness likely secondary to adhesive capsulitis. Consider corticosteroid injection, pending recommendations for her endocrinologist. \par \par Pt presents today with POCT 128, POCT A1c 7.2%, /84 and BMI 30.65 for endocrine f/u. She is feeling pretty good with no acute complaints. \par She has 5 lbs in last 12 months. Pt is still receiving Somatuline 120 mg every 4 weeks. \par She checks BS 2x a week and notes readings in 150. She walks a lot. \par \par Current Mediations: Tradjenta 5 mg qd (started on 20), Synthroid 50 mcg, Simvastatin 20mg, Metoprolol 25mg, Somatuline (Lanreotide; rx by her oncologist), Vit D, Prilosec 10mg, ASA 81 mg, Potassium \par \par Labs:\par - 21: s.creat 1.71\par - 21: s.creat 1.76, LDL-c 51\par - 20: A1c 6.8%, s.creat 1.81, , LDL-c 45, , Microalb/Creat 75, TSH 3.66, Free T4 1.1\par - 20: A1c 7.0%, s.creat 1.97, LDL-c 41, , TSH 4.19, Free T4 1.1, Microalb/Creat 184\par - 19: A1c 6.8%, s.creat 2.13, LDL-c 38, TSH 3.40, Free T4 1.0

## 2021-08-06 NOTE — ASSESSMENT
[Levothyroxine] : The patient was instructed to take Levothyroxine on an empty stomach, separate from vitamins, and wait at least 30 minutes before eating [Carbohydrate Consistent Diet] : carbohydrate consistent diet [Importance of Diet and Exercise] : importance of diet and exercise to improve glycemic control, achieve weight loss and improve cardiovascular health [Self Monitoring of Blood Glucose] : self monitoring of blood glucose [FreeTextEntry1] : 59 y/o F pt with:\par \par 1. T2DM dx in 2017 followed by chemotherapy for carcinoid syndrome. Hx of HTN, and CKD stage 3:\par She is on  Somatuline (Somastotatin agonist-Lanreotide). POCT A1c 7.2% today is fairly controlled. \par Diabetes treatment goals discussed\par F/u podiatrist/optho\par Will switch Tradjenta to Jardiance for renal protective effect. Pt is on statin. \par \par 2. Hypothyroidism afer hemithyroidectomy in 1998\par Pt is clinically euthyroid;\par Sent Levothyroxine 50 mcg. \par \par Return in 4-6 months.

## 2021-08-06 NOTE — PHYSICAL EXAM
[Alert] : alert [Normal Sclera/Conjunctiva] : normal sclera/conjunctiva [Normal Outer Ear/Nose] : the ears and nose were normal in appearance [Clear to Auscultation] : lungs were clear to auscultation bilaterally [Normal S1, S2] : normal S1 and S2 [Normal Rate] : heart rate was normal [No Edema] : no peripheral edema [Normal Bowel Sounds] : normal bowel sounds [Spine Straight] : spine straight [Normal Gait] : normal gait [No Rash] : no rash [Right Foot Was Examined] : right foot ~C was examined [Left Foot Was Examined] : left foot ~C was examined [2+] : 2+ in the dorsalis pedis [Normal Sensation on Monofilament Testing] : normal sensation on monofilament testing of lower extremities [Oriented x3] : oriented to person, place, and time [de-identified] : R thyroid lobe palpated

## 2021-08-06 NOTE — ADDENDUM
[FreeTextEntry1] : I, Suellen Caputo, acted solely as a scribe for Dr. Mike Zapata on this date. 08/04/2021.

## 2021-08-18 LAB
GLUCOSE BLDC GLUCOMTR-MCNC: 128
HBA1C MFR BLD HPLC: 7.2

## 2021-08-24 ENCOUNTER — RX RENEWAL (OUTPATIENT)
Age: 61
End: 2021-08-24

## 2021-10-19 ENCOUNTER — APPOINTMENT (OUTPATIENT)
Dept: ENDOCRINOLOGY | Facility: CLINIC | Age: 61
End: 2021-10-19
Payer: MEDICARE

## 2021-10-19 ENCOUNTER — LABORATORY RESULT (OUTPATIENT)
Age: 61
End: 2021-10-19

## 2021-10-19 VITALS
SYSTOLIC BLOOD PRESSURE: 132 MMHG | WEIGHT: 173 LBS | HEART RATE: 74 BPM | BODY MASS INDEX: 30.65 KG/M2 | DIASTOLIC BLOOD PRESSURE: 77 MMHG

## 2021-10-19 DIAGNOSIS — N39.0 URINARY TRACT INFECTION, SITE NOT SPECIFIED: ICD-10-CM

## 2021-10-19 LAB — GLUCOSE BLDC GLUCOMTR-MCNC: 224

## 2021-10-19 PROCEDURE — 99214 OFFICE O/P EST MOD 30 MIN: CPT | Mod: 25

## 2021-10-19 PROCEDURE — 36415 COLL VENOUS BLD VENIPUNCTURE: CPT

## 2021-10-19 PROCEDURE — 82962 GLUCOSE BLOOD TEST: CPT

## 2021-10-19 NOTE — REVIEW OF SYSTEMS
[As Noted in HPI] : as noted in HPI [Dysuria] : dysuria [Pelvic Pain] : pelvic pain [Vaginal Itching] : vaginal itching [Urine Is Cloudy] : cloudy urine [Negative] : Heme/Lymph

## 2021-10-21 NOTE — HISTORY OF PRESENT ILLNESS
[FreeTextEntry1] : 60 y/o F pt, with Hx of hypothyroidism after L hemithyroidectomy in , and Hx of DM (dx in ) with DM related complications of CKD. 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 Pt never had symptoms of carcinoid syndrome. Pt received Sandostatin, Everolimus, 5FU)\par PSHx: L lung (), Heart (), Cholecystectomy (), Kidney stones, hysterectomy b/l GAURAV (18),  x2 ( &), Laparoscopy \par FHx: DM (mother, sister), CA (father, grandmother), heart disease (mother) \par SHx: Former smoker (quit in ). No etOH use. Works as a . \par Lifestyle: Walks a lot. Checks BS 2x a week. \par Last funduscopic visit: 2021, pt has carcinoid in each eye(dx 20 years ago)  \par [place] [place]: [phone] \par Follows with nephrologist Dr. Hutton for CKD stage 3, hypokalemia, metabolic acidosis and HTN. \par COVID vaccination 2021\par \par 2021\par - 3/11/21 Orthopedic note review: chronic, progressive left shoulder pain and stiffness likely secondary to adhesive capsulitis. Consider corticosteroid injection, pending recommendations for her endocrinologist. \par \par Pt presents today with POCT 128, POCT A1c 7.2%, /84 and BMI 30.65 for endocrine f/u. She is feeling pretty good with no acute complaints. \par She has 5 lbs in last 12 months. Pt is still receiving Somatuline 120 mg every 4 weeks. \par She checks BS 2x a week and notes readings in 150. She walks a lot. \par \par 10/19/2021\par Pt presents today with POCT 224, /77, and BMI 30.65, for endocrine f/u. She c/o vaginal itching, dysuria, and pelvic pain due to Jardiance. Pt also states she has been urinating more frequently and when she last saw her OBGYN and had urine sample collected, her urine was cloudy. Pt has not yet received the lab results. Her BS has recently been high because she has not taken Jardiance for a few days due to her symptoms. \par Pt has carcinoid in each eye(dx 20 years ago) and saw ophthalmologist 3 weeks ago. She is taking Synthroid 0.5 mg, Simvastatin 20 mg, ASA 81, Metoprolol 25 mg, Potassium CL 10 mEq, and Somatuline(every month). She does not take Prilosec everyday.\par Pt last saw oncologist on 10/14/21. No changes in medication, she is still on Somatuline. Pt's oncologist wanted her to start of PRRT(peptide receptor radionuclide therapy) but after tests were completed, pt was told that her kidney function was not good. \par COVID vaccination in 2021. Pt had flu shot on 10/14/21. \par \par Current Mediations: Tradjenta 5 mg qd (started on 20), Synthroid 50 mcg, Simvastatin 20mg, Metoprolol 25mg, Somatuline (Lanreotide; rx by her oncologist), Vit D, Prilosec 10mg, ASA 81 mg, Potassium CL 10 mEq\par \par Labs:\par - 21: POCT A1c 7.2% \par - 21: s.creat 1.71\par - 21: s.creat 1.76, LDL-c 51\par - 20: A1c 6.8%, s.creat 1.81, , LDL-c 45, , Microalb/Creat 75, TSH 3.66, Free T4 1.1\par - 20: A1c 7.0%, s.creat 1.97, LDL-c 41, , TSH 4.19, Free T4 1.1, Microalb/Creat 184\par - 19: A1c 6.8%, s.creat 2.13, LDL-c 38, TSH 3.40, Free T4 1.0

## 2021-10-21 NOTE — END OF VISIT
[FreeTextEntry3] : All medical record entries made by the Scribe were at my, Dr. Mike Zapata, direction and personally dictated by me on 10/19/2021. I have reviewed the chart and agree that the record accurately reflects my personal performance of the history, physical exam, assessment and plan. I have also personally directed, reviewed and agreed with the chart.  [Time Spent: ___ minutes] : I have spent [unfilled] minutes of time on the encounter.

## 2021-10-21 NOTE — ADDENDUM
[FreeTextEntry1] : I, Adelaide Ozuna, acted solely as a scribe for Dr. Mike Zapata on this date. 10/19/2021.

## 2021-10-21 NOTE — ASSESSMENT
[Carbohydrate Consistent Diet] : carbohydrate consistent diet [Importance of Diet and Exercise] : importance of diet and exercise to improve glycemic control, achieve weight loss and improve cardiovascular health [Self Monitoring of Blood Glucose] : self monitoring of blood glucose [Levothyroxine] : The patient was instructed to take Levothyroxine on an empty stomach, separate from vitamins, and wait at least 30 minutes before eating [FreeTextEntry1] : 59 y/o F pt with:\par \par 1. T2DM, dx in 2017 after receiving chemotherapy for carcinoid syndrome (metastasis including orbit) \par Pt also has Hx of HTN and CKD stage 3. S.creat levels are stable. \par Pt was prescribed Jardiance 08/2021, which had to be discontinued a couple days ago after developing UTI and vaginal discharge. As a result, her blood sugars have increased: FBS 150s, PBBS in mid-200s. \par Will initiate GLP1 Trulicity 0.75 qw and monitor pt's response. \par Pt was recommended to keep good hydration. \par \par 2. Hypothyroidism, following hemithyroidectomy in 1998\par She is clinically euthyroid. \par Send TFTs and continue with Levothyroxine 50 mcg. \par \par Return in 4 months.

## 2021-10-21 NOTE — PHYSICAL EXAM
[Alert] : alert [Normal Sclera/Conjunctiva] : normal sclera/conjunctiva [Normal Outer Ear/Nose] : the ears and nose were normal in appearance [Clear to Auscultation] : lungs were clear to auscultation bilaterally [Normal S1, S2] : normal S1 and S2 [Normal Rate] : heart rate was normal [No Edema] : no peripheral edema [Normal Bowel Sounds] : normal bowel sounds [Spine Straight] : spine straight [Normal Gait] : normal gait [No Rash] : no rash [2+] : 2+ in the dorsalis pedis [Normal Sensation on Monofilament Testing] : normal sensation on monofilament testing of lower extremities [Oriented x3] : oriented to person, place, and time [de-identified] : R thyroid lobe palpated

## 2021-11-30 ENCOUNTER — APPOINTMENT (OUTPATIENT)
Dept: NEPHROLOGY | Facility: CLINIC | Age: 61
End: 2021-11-30
Payer: MEDICARE

## 2021-11-30 VITALS — DIASTOLIC BLOOD PRESSURE: 75 MMHG | HEART RATE: 78 BPM | SYSTOLIC BLOOD PRESSURE: 124 MMHG

## 2021-11-30 PROCEDURE — 99214 OFFICE O/P EST MOD 30 MIN: CPT

## 2021-11-30 RX ORDER — EMPAGLIFLOZIN 10 MG/1
10 TABLET, FILM COATED ORAL DAILY
Qty: 90 | Refills: 3 | Status: DISCONTINUED | COMMUNITY
Start: 2021-08-04 | End: 2021-11-30

## 2021-11-30 NOTE — HISTORY OF PRESENT ILLNESS
[FreeTextEntry1] : Kindly referred by Dr. Manuel Arevalo on 31Jan19 for decreased kidney function. Covid vaccine x 3 2021. \par \par * Off jardiance due to vaginal itching. * On trulicity. * CKD stable, creatinine 1.56. * No symptoms of kidney stones. * * Receiving monthly injections of somatulin for carcinoid. * Hypokalemia controlled. \par \par Previous history (22Jan21): * CKD stable, creatinine 1.69 (eGFR 32). 1 epsiode of hematuria. Mother may have had kidney disease. * Chronic back pain for 1 month. No neurologic symptoms. Following up with Dr. Arevalo. No numbness, weakness, CP or SOB. * No symptoms of kidney stones. Renal US 2018. \par \par * CKD stable, creatinine 1.81. Hypokalemia improved, K increased to 3.8. * Sodium bicarb restarted for bicarb of 17. * DM controlled, HGA1c 6.8. * Albuminuria decreased to 75. Labs checked by Dr. Arevalo in September. She will obtain labs. Reportedly labs were stable. * No symptoms of kidney stones. \par \par Previous history (28May20): * CKD previously stable, creatinine 1.97 in 1/20. * K-dur increased to 10 bid for K of 3.5. * Sodium bicarb (1/2 tsp TIW) restarted for HCO3 of 19. * Labs checked by Dr. Arevalo today. \par \par Previous history (10Jan20): * CKD stable. * Slight hypokalemia. * HCO3 19. She has not taken sodium bicarbonate. \par \par Previous history (07Oct19): * CKD stable, creatinine 2.13. * K 3.4. K-dur 10 meq started last visit. * Labs checked 1 week ago by Dr. Arevalo. (The patient has been advised to call their office and arrange copies of notes/labs to be sent.) * She is only taking sodium bicarbonate three times weekly. \par \par Previous history (27Aug19): * CKD stable, creatinine 2.13 in August 2019. * Slight hypokalemia, K 3.4. No diuretics, vomiting, or diarrhea. * HCO3 increased to 18 on sodium bicarbonate but she has taken this only three times weekly. \par \par Previous history (28May19): * S/P hysterectomy and bilateral GAURAV. 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 * CKD previous stable, creatinine 2.21. * K decreased to 2.9. Repeat K was improved in ED. She has increased in potassium in her diet. * Oral bicarb started for acidosis. (1 tsp)\par \par Previous history (26Mar19): * Progressive CKD. Creatinine 2.18. * HCO3 15 previously. Baking soda 650 bid started.  * HTN controlled. No lightheadedness. Compliant with medications. Following low salt diet. * Off metformin. * 1.6 g/g proteinuria. \par \par Previous history (20Feb19): * HTN controlled, 120s - 130s at home. No lightheadedness. Compliant with medications. Following low salt diet. * Proteinuria 1.6 g/g. *  Metformin was stopped last visit. Nausea resolved.* CKD stable, creatinine 2.1. * HCO3 15. She has not started baking soda as instructed. 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 She has never had flushing or carcinoid syndrome. Since 2013, she has been treated with monthly sandostatin and in 2014 was treated with everolimus (afinitor). In 2017, she was treated with 5FU (Nov 17 - July 18) and another type of chemotherapy, though she is not on this currently. She was diagnosed with diabetes in 2017 and started on metformin, which she is still taking. In 2017 she was also started on lisinopril by her cardiologist "to protect her kidneys". She first became aware of decreased kidney function in about November of 2018. She is uncertain what her creatinine is but believes her kidney function is 17 percent. (See below.) Labs have been requested and are currently pending. A renal ultrasound on 12/3/18 revealed 9.5/9.8 kidneys which were echogenic. There was no hydronephrosis. The patient denies exposure to chronic NSAIDs, phosphate bowel preparations, PPIs, green smoothies, creatine, or herbal supplements.\par \par One episode of kidney stone which passed spontaneously in 2004. \par \par ADDENDUM: Creatinine 2.07, GFR 25 on labs from 1/7/19. Per discussion with Dr. Arevalo, her creatinine has increased in May 2018. It has been stable since November 2018. \par \par \par

## 2021-11-30 NOTE — ASSESSMENT
[FreeTextEntry1] : # CKD stage 3 c/w DM nephropathy.\par * Will consider losartan 12.5. \par * Therapies for kidney disease: blood pressure control; GLP1 agonist; other evidence-based therapies including exercise, a plant-based lower oxalate diet, and 400 mcg folic acid daily\par * Cardiovascular disease prevention: counseling on healthy diet, physical activity, weight loss, alcohol limitation, blood pressure control\par * A counseling information sheet has been given (today or previously). All their questions were answered.\par * The patient has been counseled that chronic kidney disease is a significant condition and regular office followup with me (at least every 4 months for now) is important for monitoring and their health, and that it is their responsibility to make a follow up appointment.\par * The patient has been counseled never to stop taking their medications without discussing it with me or another doctor.\par * The patient has been counseled on avoiding NSAIDs.\par * The patient has been counseled on risk of acute renal failure and instructed to immediately call and speak with me or go immediately to ER with any severe symptoms, nausea, vomiting, diarrhea, chest pain, or shortness of breath.\par \par # Hypokalemia.\par * Cont k-dur.\par \par # HTN controlled.\par * Cont metoprolol (required for CKD). \par * The patient's blood pressure was checked with the [employer] [id number] using the SPRINT trial protocol after sitting quietly in an empty room with arm supported, back supported, and feet on the floor for 5 minutes. The average of 3 readings were taken.\par * A counseling information sheet has been given (today or previously). All their questions were answered.\par * The patient has been counseled to check their BP at home with an automatic arm cuff, write down the readings, and reach me directly on the phone immediately if they are persistently > 180 systolic or if SBP is less than 100 or if lightheadedness develops. They were counseled to bring in all blood pressure readings and medications next visit.\par * The patient has been counseled that regular office followup (at least every 4 months for now)  is important for monitoring and for their health, and that it is their responsibility to make follow up appointments.\par * The patient also has been counseled that they must never stop or change any medications without discussing this with me (or another physician). \par \par # Metabolic acidosis.\par * Continue baking soda.\par

## 2021-12-08 ENCOUNTER — APPOINTMENT (OUTPATIENT)
Dept: ENDOCRINOLOGY | Facility: CLINIC | Age: 61
End: 2021-12-08

## 2021-12-18 ENCOUNTER — RX RENEWAL (OUTPATIENT)
Age: 61
End: 2021-12-18

## 2022-01-01 NOTE — ED ADULT NURSE NOTE - NS ED NOTE  TALK SOMEONE YN
No Admission Reconciliation is Completed  Discharge Reconciliation is Not Complete Admission Reconciliation is Completed  Discharge Reconciliation is Completed

## 2022-01-18 ENCOUNTER — APPOINTMENT (OUTPATIENT)
Dept: ENDOCRINOLOGY | Facility: CLINIC | Age: 62
End: 2022-01-18

## 2022-01-27 LAB
ALBUMIN SERPL ELPH-MCNC: 4.4 G/DL
ALP BLD-CCNC: 179 U/L
ALT SERPL-CCNC: 13 U/L
ANION GAP SERPL CALC-SCNC: 16 MMOL/L
APPEARANCE: ABNORMAL
AST SERPL-CCNC: 16 U/L
BACTERIA UR CULT: NORMAL
BILIRUB SERPL-MCNC: 0.5 MG/DL
BILIRUBIN URINE: NEGATIVE
BLOOD URINE: NORMAL
BUN SERPL-MCNC: 24 MG/DL
CALCIUM SERPL-MCNC: 9.9 MG/DL
CHLORIDE SERPL-SCNC: 101 MMOL/L
CHOLEST SERPL-MCNC: 150 MG/DL
CO2 SERPL-SCNC: 19 MMOL/L
COLOR: YELLOW
CREAT SERPL-MCNC: 1.56 MG/DL
CREAT SPEC-SCNC: 117 MG/DL
ESTIMATED AVERAGE GLUCOSE: 174 MG/DL
GLUCOSE QUALITATIVE U: ABNORMAL
GLUCOSE SERPL-MCNC: 218 MG/DL
HBA1C MFR BLD HPLC: 7.7 %
HDLC SERPL-MCNC: 73 MG/DL
KETONES URINE: NEGATIVE
LDLC SERPL CALC-MCNC: 49 MG/DL
LEUKOCYTE ESTERASE URINE: ABNORMAL
MICROALBUMIN 24H UR DL<=1MG/L-MCNC: 8.3 MG/DL
MICROALBUMIN/CREAT 24H UR-RTO: 71 MG/G
NITRITE URINE: NEGATIVE
NONHDLC SERPL-MCNC: 77 MG/DL
PH URINE: 6
POTASSIUM SERPL-SCNC: 4.4 MMOL/L
PROT SERPL-MCNC: 7.3 G/DL
PROTEIN URINE: ABNORMAL
SODIUM SERPL-SCNC: 137 MMOL/L
SPECIFIC GRAVITY URINE: 1.03
T4 FREE SERPL-MCNC: 1.3 NG/DL
TRIGL SERPL-MCNC: 140 MG/DL
TSH SERPL-ACNC: 3.86 UIU/ML
UROBILINOGEN URINE: NORMAL

## 2022-02-11 ENCOUNTER — APPOINTMENT (OUTPATIENT)
Dept: ENDOCRINOLOGY | Facility: CLINIC | Age: 62
End: 2022-02-11
Payer: MEDICARE

## 2022-02-11 VITALS
DIASTOLIC BLOOD PRESSURE: 75 MMHG | BODY MASS INDEX: 30.47 KG/M2 | SYSTOLIC BLOOD PRESSURE: 117 MMHG | WEIGHT: 172 LBS | HEART RATE: 90 BPM

## 2022-02-11 LAB — HBA1C MFR BLD HPLC: 6.8

## 2022-02-11 PROCEDURE — 99215 OFFICE O/P EST HI 40 MIN: CPT | Mod: 25

## 2022-02-11 PROCEDURE — 83036 HEMOGLOBIN GLYCOSYLATED A1C: CPT | Mod: QW

## 2022-02-11 NOTE — HISTORY OF PRESENT ILLNESS
[FreeTextEntry1] : 60 y/o F pt, with Hx of hypothyroidism after L hemithyroidectomy in , and Hx of DM (dx in ) with DM related complications of CKD. 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 Pt never had symptoms of carcinoid syndrome. Pt received Sandostatin, Everolimus, 5FU)\par PSHx: L lung (), Heart (), Cholecystectomy (), Kidney stones, hysterectomy b/l GAURAV (19),  x2 ( &), Laparoscopy \par FHx: DM (mother, sister), CA (father, grandmother), heart disease (mother) \par SHx: Former smoker (quit in ). No etOH use. Works as a . \par Lifestyle: Walks a lot. Checks BS 2x a week. \par Last funduscopic visit: 2021, pt has carcinoid in each eye(dx 20 years ago)  \par Ophthalmologist: Dr. Viridiana Sarkar ([facility] [place]), phone: [phone] \par Follows with nephrologist Dr. Hutton for CKD stage 3, hypokalemia, metabolic acidosis and HTN. \par COVID vaccination 2021\par \par 2021\par - 3/11/21 Orthopedic note review: chronic, progressive left shoulder pain and stiffness likely secondary to adhesive capsulitis. Consider corticosteroid injection, pending recommendations for her endocrinologist. \par \par Pt presents today with POCT 128, POCT A1c 7.2%, /84 and BMI 30.65 for endocrine f/u. She is feeling pretty good with no acute complaints. \par She has 5 lbs in last 12 months. Pt is still receiving Somatuline 120 mg every 4 weeks. \par She checks BS 2x a week and notes readings in 150. She walks a lot. \par \par 10/19/2021\par Pt presents today with POCT 224, /77, and BMI 30.65, for endocrine f/u. She c/o vaginal itching, dysuria, and pelvic pain due to Jardiance. Pt also states she has been urinating more frequently and when she last saw her OBGYN and had urine sample collected, her urine was cloudy. Pt has not yet received the lab results. Her BS has recently been high because she has not taken Jardiance for a few days due to her symptoms. \par Pt has carcinoid in each eye(dx 20 years ago) and saw ophthalmologist 3 weeks ago. She is taking Synthroid 0.5 mg, Simvastatin 20 mg, ASA 81, Metoprolol 25 mg, Potassium CL 10 mEq, and Somatuline(every month). She does not take Prilosec everyday.\par Pt last saw oncologist on 10/14/21. No changes in medication, she is still on Somatuline. Pt's oncologist wanted her to start of PRRT(peptide receptor radionuclide therapy) but after tests were completed, pt was told that her kidney function was not good. \par COVID vaccination in 2021. Pt had flu shot on 10/14/21. \par \par 2022\par Review of pt chart: Pt has been recently seen by Dr. Hutton for declining kidney function and plans to initiate PRRT. \par \par Pt presents today with POCT 105, /75, and BMI 30.47, feeling well. She saw Dr. Hutton to see if her kidneys were functioning properly in preparation for PRRT(nuclear medicine treatment). Pt then saw the head of the department at [facility] and was told that she is not a candidate for PRRT because kidney function was very low. Pt is not yet sure when she will have PRRT treatment.\par Pt has been on Trulicity 0.75 mg for the past 3 months. She says her BS has been good, but there was a BS reading of 245 one day last week after dinner. Pt has been feeling tired, yesterday she saw oncologist and was informed her hemoglobin was low. Pt is not sure if she will receive iron IV at the next visit.  \par \par Current Mediations: Tradjenta 5 mg qd (started on 20), Synthroid 50 mcg, Simvastatin 20mg, Metoprolol 25mg, Somatuline (Lanreotide; rx by her oncologist), Vit D, Prilosec 10mg, ASA 81 mg, Potassium CL 10 mEq

## 2022-02-11 NOTE — ADDENDUM
[FreeTextEntry1] : I, Adelaide Ozuna, acted solely as a scribe for Dr. Mike Zapata on this date. 02/11/2022.

## 2022-02-11 NOTE — DATA REVIEWED
[FreeTextEntry1] : Labs:\par - 2/11/2022: POCT A1c 6.8%\par - 10/19/21: A1c 7.7%, s.creat 1.56, Ca 9.9, ACR 71, Free T4 1.3, TSH 3.86, LDL-c 49 \par - 10/14/21: s.creat 1.68, Ca 9.5, \par - 8/4/21: POCT A1c 7.2% \par - 07/29/21: s.creat 1.71\par - 04/01/21: s.creat 1.76, LDL-c 51\par - 07/29/20: A1c 6.8%, s.creat 1.81, , LDL-c 45, , Microalb/Creat 75, TSH 3.66, Free T4 1.1\par - 01/07/20: A1c 7.0%, s.creat 1.97, LDL-c 41, , TSH 4.19, Free T4 1.1, Microalb/Creat 184\par - 08/26/19: A1c 6.8%, s.creat 2.13, LDL-c 38, TSH 3.40, Free T4 1.0\par \par Imaging: \par - 12/20/2021: CT Scan of chest, abdomen, and pelvis: The pancreas is mildly atrophic. Small b/l adrenal adenomas are again evident. L measures 1.7 cm and R measures 1.6 cm. It was remeasured similarly on the previous examination. These have not significantly changed. L thyroid nodule

## 2022-02-11 NOTE — ASSESSMENT
[Carbohydrate Consistent Diet] : carbohydrate consistent diet [Importance of Diet and Exercise] : importance of diet and exercise to improve glycemic control, achieve weight loss and improve cardiovascular health [Self Monitoring of Blood Glucose] : self monitoring of blood glucose [Levothyroxine] : The patient was instructed to take Levothyroxine on an empty stomach, separate from vitamins, and wait at least 30 minutes before eating [FreeTextEntry1] : 60 y/o F pt with:\par \par 1. T2DM, dx in 2017   \par Pt has Hx of carcinoid syndrome. Chest and abdominal CT scan from 12/2021 shows: "There has been interval disease progression with enlargement of the subcarinal mass and enlargement of hepatic lesions."\par There is a plan for nuclear medicine treatment, PRRT.  Pt has been referred for consultation with nephrologist due to Hx of CKD. \par She was started on Trulicity 0.75 mg on 11/2021 after d/c Jardiance because of UTIs. \par Pt has no osmotic diuresis symptoms and glucose levels are fairly controlled. \par POCT A1c 6.8% today \par \par 2. Hypothyroidism, s/p hemithyroidectomy in 1998\par She is clinically euthyroid. Recent TSH of 3.86 on 10/19/21. \par 12/29/2021 CAT scan of the chest abdomen and pelvic: The right thyroid lobe is surgically absent, the left thyroid lobe is heterogeneous in density with a focal 1.1 cm hypodense nodule.  Requesting neck thyroid ultrasound\par Recommend pt continue with Levothyroxine 50 mcg. \par \par 3. Adrenal adenomas b/l \par Imaging studies were compared to previous examinations and no significant changes were found. \par Plan is to monitor growth rate of these adenomas. \par \par \par Return in 4 months.

## 2022-02-11 NOTE — PHYSICAL EXAM
[Alert] : alert [Normal Sclera/Conjunctiva] : normal sclera/conjunctiva [Normal Outer Ear/Nose] : the ears and nose were normal in appearance [Clear to Auscultation] : lungs were clear to auscultation bilaterally [Normal S1, S2] : normal S1 and S2 [Normal Rate] : heart rate was normal [No Edema] : no peripheral edema [Normal Bowel Sounds] : normal bowel sounds [Spine Straight] : spine straight [Normal Gait] : normal gait [No Rash] : no rash [2+] : 2+ in the dorsalis pedis [Normal Sensation on Monofilament Testing] : normal sensation on monofilament testing of lower extremities [Oriented x3] : oriented to person, place, and time [de-identified] :  no nodules palpated

## 2022-02-11 NOTE — END OF VISIT
[Time Spent: ___ minutes] : I have spent [unfilled] minutes of time on the encounter. [FreeTextEntry3] : All medical record entries made by the Scribe were at my, Dr. Mike Zapata, direction and personally dictated by me on 02/11/2022. I have reviewed the chart and agree that the record accurately reflects my personal performance of the history, physical exam, assessment and plan. I have also personally directed, reviewed and agreed with the chart.

## 2022-02-14 LAB — GLUCOSE BLDC GLUCOMTR-MCNC: 105

## 2022-03-15 ENCOUNTER — APPOINTMENT (OUTPATIENT)
Dept: NEPHROLOGY | Facility: CLINIC | Age: 62
End: 2022-03-15

## 2022-03-17 ENCOUNTER — RX RENEWAL (OUTPATIENT)
Age: 62
End: 2022-03-17

## 2022-04-08 ENCOUNTER — APPOINTMENT (OUTPATIENT)
Dept: NEPHROLOGY | Facility: CLINIC | Age: 62
End: 2022-04-08

## 2022-05-16 ENCOUNTER — RX RENEWAL (OUTPATIENT)
Age: 62
End: 2022-05-16

## 2022-05-30 ENCOUNTER — RX RENEWAL (OUTPATIENT)
Age: 62
End: 2022-05-30

## 2022-06-20 ENCOUNTER — APPOINTMENT (OUTPATIENT)
Dept: NEPHROLOGY | Facility: CLINIC | Age: 62
End: 2022-06-20

## 2022-07-01 ENCOUNTER — APPOINTMENT (OUTPATIENT)
Dept: MAMMOGRAPHY | Facility: CLINIC | Age: 62
End: 2022-07-01

## 2022-07-01 ENCOUNTER — APPOINTMENT (OUTPATIENT)
Dept: ULTRASOUND IMAGING | Facility: CLINIC | Age: 62
End: 2022-07-01

## 2022-07-01 PROCEDURE — 77065 DX MAMMO INCL CAD UNI: CPT | Mod: RT

## 2022-07-01 PROCEDURE — G0279: CPT | Mod: RT

## 2022-07-05 PROCEDURE — 76641 ULTRASOUND BREAST COMPLETE: CPT | Mod: RT

## 2022-07-27 ENCOUNTER — APPOINTMENT (OUTPATIENT)
Dept: ENDOCRINOLOGY | Facility: CLINIC | Age: 62
End: 2022-07-27

## 2022-07-27 VITALS
WEIGHT: 169 LBS | SYSTOLIC BLOOD PRESSURE: 146 MMHG | DIASTOLIC BLOOD PRESSURE: 83 MMHG | HEART RATE: 84 BPM | BODY MASS INDEX: 29.94 KG/M2

## 2022-07-27 LAB — HBA1C MFR BLD HPLC: 7.4

## 2022-07-27 PROCEDURE — 99214 OFFICE O/P EST MOD 30 MIN: CPT | Mod: 25

## 2022-07-27 PROCEDURE — 83036 HEMOGLOBIN GLYCOSYLATED A1C: CPT | Mod: QW

## 2022-07-27 RX ORDER — DULAGLUTIDE 0.75 MG/.5ML
0.75 INJECTION, SOLUTION SUBCUTANEOUS
Qty: 2 | Refills: 3 | Status: DISCONTINUED | COMMUNITY
Start: 2021-11-19 | End: 2022-07-27

## 2022-07-27 RX ORDER — DOCUSATE SODIUM 100 MG/1
100 CAPSULE ORAL TWICE DAILY
Qty: 60 | Refills: 0 | Status: DISCONTINUED | COMMUNITY
Start: 2019-04-17 | End: 2022-07-27

## 2022-07-27 RX ORDER — SODIUM BICARBONATE 650 MG/1
650 TABLET ORAL
Qty: 180 | Refills: 3 | Status: DISCONTINUED | COMMUNITY
Start: 2019-02-20 | End: 2022-07-27

## 2022-07-27 RX ORDER — CLOTRIMAZOLE 10 MG/G
1 CREAM TOPICAL TWICE DAILY
Qty: 1 | Refills: 2 | Status: DISCONTINUED | COMMUNITY
Start: 2019-04-26 | End: 2022-07-27

## 2022-07-27 RX ORDER — CYCLOBENZAPRINE HYDROCHLORIDE 5 MG/1
5 TABLET, FILM COATED ORAL
Qty: 30 | Refills: 1 | Status: DISCONTINUED | COMMUNITY
Start: 2021-01-26 | End: 2022-07-27

## 2022-07-27 RX ORDER — LINAGLIPTIN 5 MG/1
5 TABLET, FILM COATED ORAL DAILY
Qty: 90 | Refills: 0 | Status: DISCONTINUED | COMMUNITY
Start: 2020-01-07 | End: 2022-07-27

## 2022-07-27 RX ORDER — TRAMADOL HYDROCHLORIDE 50 MG/1
50 TABLET, COATED ORAL
Qty: 28 | Refills: 0 | Status: DISCONTINUED | COMMUNITY
Start: 2021-01-26 | End: 2022-07-27

## 2022-07-27 NOTE — REASON FOR VISIT
[Follow - Up] : a follow-up visit [DM Type 2] : DM Type 2 [Hypothyroidism] : hypothyroidism [Adrenal Evaluation/Adrenal Disorder] : adrenal evaluation/adrenal disorder

## 2022-07-29 NOTE — PHYSICAL EXAM
[Alert] : alert [Normal Sclera/Conjunctiva] : normal sclera/conjunctiva [Normal Outer Ear/Nose] : the ears and nose were normal in appearance [Clear to Auscultation] : lungs were clear to auscultation bilaterally [Normal S1, S2] : normal S1 and S2 [Normal Rate] : heart rate was normal [No Edema] : no peripheral edema [Normal Bowel Sounds] : normal bowel sounds [Spine Straight] : spine straight [Normal Gait] : normal gait [No Rash] : no rash [2+] : 2+ in the dorsalis pedis [Oriented x3] : oriented to person, place, and time [No Thyroid Nodules] : no palpable thyroid nodules [No Tremors] : no tremors [Acanthosis Nigricans] : no acanthosis nigricans

## 2022-07-29 NOTE — ASSESSMENT
[FreeTextEntry1] : 60 y/o F pt with:\par \par 1. Hypothyroidism, s/p hemithyroidectomy in 1998:\par Pt with no obstructive symptoms. She is clinically euthyroid. \par 09/2021 TSH was normal. Order TSH today. \par Recommend pt continue with Levothyroxine 50 mcg. \par \par 2. T2DM diagnosed in 2017:\par Pt is on Somatuline for history of carcinoid syndrome. POCT A1c 7.4% today (increased). Pt is on Trulicity 0.75 mg. She would like to stop Trulicity because of its price and would like to start Jardiance again. Prescribe Jardiance 10 mg. She should restrict her calories and increase physical activities as tolerated. \par Send metabolic profile and urine test today. \par \par 3. Adrenal adenomas b/l \par No symptoms related to excess adrenal hormone production. \par Most recent MRI which was compared to MRI in 2019, is in essence unchanged (refer to labs for details). \par \par Return in 4 months. [Importance of Diet and Exercise] : importance of diet and exercise to improve glycemic control, achieve weight loss and improve cardiovascular health

## 2022-07-29 NOTE — HISTORY OF PRESENT ILLNESS
[FreeTextEntry1] : 60 y/o F pt, with Hx of hypothyroidism after L hemithyroidectomy in , and Hx of DM (dx in 2017) with DM related complications of CKD. \par Home glucose monitoring: [employer]. 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 Pt never had symptoms of carcinoid syndrome. Pt received Sandostatin, Everolimus, 5FU), COVID (2022).\par PSHx: L lung (), Heart (), Cholecystectomy (), Kidney stones, hysterectomy b/l GAURAV (19),  x2 ( &), Laparoscopy \par FHx: DM (mother, sister), CA (father, grandmother), heart disease (mother) \par SHx: Former smoker (quit in ). No etOH use. Works as a . \par Lifestyle: Walks a lot. Checks BS 2x a week. \par COVID vaccination 2021, flu shot on 10/14/21.\par Last funduscopic visit: 2021, pt has carcinoid in each eye(dx 20 years ago)  \par Ophthalmologist: Dr. Viridiana Sarkar ([facility] [place]), phone: [phone] \par Follows with nephrologist Dr. Hutton for CKD stage 3, hypokalemia, metabolic acidosis and HTN. \par \par 2022\par Review of pt chart: Pt has been recently seen by Dr. Hutton for declining kidney function and plans to initiate PRRT. \par \par Pt presents today with POCT 105, /75, and BMI 30.47, feeling well. She saw Dr. Hutton to see if her kidneys were functioning properly in preparation for PRRT(nuclear medicine treatment). Pt then saw the head of the department at [facility] and was told that she is not a candidate for PRRT because kidney function was very low. Pt is not yet sure when she will have PRRT treatment.\par Pt has been on Trulicity 0.75 mg for the past 3 months. She says her BS has been good, but there was a BS reading of 245 one day last week after dinner. Pt has been feeling tired, yesterday she saw oncologist from [facility] and was informed her hemoglobin was low. Pt is not sure if she will receive iron IV at the next visit.  \par \par 2022\par Review of pt's chart:\par - 21 CT of abd and pelvis (compared to 2020 CT): R thyroid lobe is absent. L thyroid 1.1 cm hypodense nodule. Small bilateral adrenal adenomas measuring 1.7 cm in the L and 1.6 cm in the R. This has not significantly changed. \par \par Pt has POCT A1c 7.4%, POCT 186, /83 and BMI 29.94. She lost 3 lbs in 5 months. \par Today, pt is feeling well in general, with no other complaints other than feeling tired often. Pt endorses occasional palpitations. She saw the oncologist on 22 and was recommended to start on PRRT (Liuthera) for her carcinoid. CT scan was completed prior to this plan. It supposedly is supposed to decrease the tumor, but pt is concerned about her kidneys. She receives somatuline injection every month. Pt notes kidney stone has been resolved.  \par Denies hot flashes, diarrheas, and abdominal pain. \par  \par Current Mediations: Trulicity 0.75 mg, Synthroid 50 mcg, Somatuline (Lanreotide; rx by her oncologist), Simvastatin 20 mg, Metoprolol 25 mg, Vit D, Prilosec 10 mg prn, ASA 81 mg, Potassium CL 10 mEq\par - Held: Sandostatin, Tradjenta 5 mg qd (started on 20)\par \par Medication modified/added this visit: Jardiance 10 mg (rx 2022), Hold Trulicity 0.75 mg (DCed 2022)\par

## 2022-07-29 NOTE — REVIEW OF SYSTEMS
[Negative] : Heme/Lymph [Fatigue] : fatigue [As Noted in HPI] : as noted in HPI [Palpitations] : palpitations [Abdominal Pain] : no abdominal pain [Diarrhea] : no diarrhea [Hot Flashes] : no hot flashes

## 2022-07-29 NOTE — END OF VISIT
[FreeTextEntry3] : All medical record entries made by the Scribe were at my, Dr. Mike Zapata, direction and personally dictated by me on 07/27/2022. I have reviewed the chart and agree that the record accurately reflects my personal performance of the history, physical exam, assessment and plan. I have also personally directed, reviewed and agreed with the chart.  [Time Spent: ___ minutes] : I have spent [unfilled] minutes of time on the encounter.

## 2022-08-08 ENCOUNTER — LABORATORY RESULT (OUTPATIENT)
Age: 62
End: 2022-08-08

## 2022-08-08 ENCOUNTER — APPOINTMENT (OUTPATIENT)
Dept: NEPHROLOGY | Facility: CLINIC | Age: 62
End: 2022-08-08

## 2022-08-08 VITALS — SYSTOLIC BLOOD PRESSURE: 117 MMHG | DIASTOLIC BLOOD PRESSURE: 74 MMHG | HEART RATE: 93 BPM

## 2022-08-08 DIAGNOSIS — R68.89 OTHER GENERAL SYMPTOMS AND SIGNS: ICD-10-CM

## 2022-08-08 DIAGNOSIS — Z79.899 OTHER LONG TERM (CURRENT) DRUG THERAPY: ICD-10-CM

## 2022-08-08 DIAGNOSIS — R79.9 ABNORMAL FINDING OF BLOOD CHEMISTRY, UNSPECIFIED: ICD-10-CM

## 2022-08-08 PROCEDURE — 36415 COLL VENOUS BLD VENIPUNCTURE: CPT

## 2022-08-08 PROCEDURE — 99214 OFFICE O/P EST MOD 30 MIN: CPT | Mod: 25

## 2022-08-08 NOTE — HISTORY OF PRESENT ILLNESS
[FreeTextEntry1] : Kindly referred by Dr. Manuel Arevalo on 31Jan19 for decreased kidney function. Covid vaccine x 3 2021. \par \par * DM up to 7.4. * CKD stable, creatinine 1.6 on 6/15. * No symptoms of kidney stones. 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 * Hypokalemia controlled. * Trulicity switched to jardiance. * HTN controlled. No lightheadedness. No CP/SOB. Compliant with medications. * She is undergoing somatulin therapy for carcinoid. \par \par Previous history (30Nov21): * Off jardiance due to vaginal itching. * On trulicity. * CKD stable, creatinine 1.56. * No symptoms of kidney stones. * * Receiving monthly injections of somatulin for carcinoid. * Hypokalemia controlled. \par \par Previous history (22Jan21): * CKD stable, creatinine 1.69 (eGFR 32). 1 epsiode of hematuria. Mother may have had kidney disease. * Chronic back pain for 1 month. No neurologic symptoms. Following up with Dr. Arevalo. No numbness, weakness, CP or SOB. * No symptoms of kidney stones. Renal US 2018. \par \par * CKD stable, creatinine 1.81. Hypokalemia improved, K increased to 3.8. * Sodium bicarb restarted for bicarb of 17. * DM controlled, HGA1c 6.8. * Albuminuria decreased to 75. Labs checked by Dr. Arevalo in September. She will obtain labs. Reportedly labs were stable. * No symptoms of kidney stones. \par \par Previous history (28May20): * CKD previously stable, creatinine 1.97 in 1/20. * K-dur increased to 10 bid for K of 3.5. * Sodium bicarb (1/2 tsp TIW) restarted for HCO3 of 19. * Labs checked by Dr. Arevalo today. \par \par Previous history (10Jan20): * CKD stable. * Slight hypokalemia. * HCO3 19. She has not taken sodium bicarbonate. \par \par Previous history (07Oct19): * CKD stable, creatinine 2.13. * K 3.4. K-dur 10 meq started last visit. * Labs checked 1 week ago by Dr. Arevalo. (The patient has been advised to call their office and arrange copies of notes/labs to be sent.) * She is only taking sodium bicarbonate three times weekly. \par \par Previous history (27Aug19): * CKD stable, creatinine 2.13 in August 2019. * Slight hypokalemia, K 3.4. No diuretics, vomiting, or diarrhea. * HCO3 increased to 18 on sodium bicarbonate but she has taken this only three times weekly. \par \par Previous history (28May19): * S/P hysterectomy and bilateral GAURAV. 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 * CKD previous stable, creatinine 2.21. * K decreased to 2.9. Repeat K was improved in ED. She has increased in potassium in her diet. * Oral bicarb started for acidosis. (1 tsp)\par \par Previous history (26Mar19): * Progressive CKD. Creatinine 2.18. * HCO3 15 previously. Baking soda 650 bid started.  * HTN controlled. No lightheadedness. Compliant with medications. Following low salt diet. * Off metformin. * 1.6 g/g proteinuria. \par \par Previous history (20Feb19): * HTN controlled, 120s - 130s at home. No lightheadedness. Compliant with medications. Following low salt diet. * Proteinuria 1.6 g/g. *  Metformin was stopped last visit. Nausea resolved.* CKD stable, creatinine 2.1. * HCO3 15. She has not started baking soda as instructed. 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 She has never had flushing or carcinoid syndrome. Since 2013, she has been treated with monthly sandostatin and in 2014 was treated with everolimus (afinitor). In 2017, she was treated with 5FU (Nov 17 - July 18) and another type of chemotherapy, though she is not on this currently. She was diagnosed with diabetes in 2017 and started on metformin, which she is still taking. In 2017 she was also started on lisinopril by her cardiologist "to protect her kidneys". She first became aware of decreased kidney function in about November of 2018. She is uncertain what her creatinine is but believes her kidney function is 17 percent. (See below.) Labs have been requested and are currently pending. A renal ultrasound on 12/3/18 revealed 9.5/9.8 kidneys which were echogenic. There was no hydronephrosis. The patient denies exposure to chronic NSAIDs, phosphate bowel preparations, PPIs, green smoothies, creatine, or herbal supplements.\par \par One episode of kidney stone which passed spontaneously in 2004. \par \par ADDENDUM: Creatinine 2.07, GFR 25 on labs from 1/7/19. Per discussion with Dr. Arevalo, her creatinine has increased in May 2018. It has been stable since November 2018. \par \par \par

## 2022-08-08 NOTE — ASSESSMENT
[FreeTextEntry1] : # CKD stage 3 c/w DM nephropathy.\par * Will consider losartan 12.5.\par * Therapies for kidney disease: blood pressure control; DM control;  cont Jardiance; other evidence-based therapies including exercise, a plant-based lower oxalate diet, and 400 mcg folic acid daily\par * Cardiovascular disease prevention: counseling on healthy diet, physical activity, weight loss, alcohol limitation, blood pressure control; statin therapy; cardiology evaluation/followup advised\par * A counseling information sheet on CKD which they have been instructed to read has been given.\par * The patient has been counseled that chronic kidney disease is a significant condition and regular office followup with me (at least every 4 months for now) is important for monitoring and their health, and that it is their responsibility to make a follow up appointment.\par * The patient has been counseled never to stop taking their medications without discussing it with me or another doctor.\par * The patient has been counseled on avoiding NSAIDs.\par * The patient has been counseled on risk of worsening kidney function and instructed to immediately call and speak with me and go immediately to ER with any severe symptoms, nausea, vomiting, diarrhea, chest pain, or shortness of breath.\par \par # HTN controlled.\par * Cont metoprolol.\par * The patient's blood pressure was checked with the [employer] [id number] using the SPRINT trial protocol after sitting quietly in an empty room with arm supported, back supported, and feet on the floor for 5 minutes. The average of 3 readings were taken.\par * A counseling information sheet on blood pressure and staying healthy which they have been instructed to read has been given.\par * The patient has been counseled to check their BP at home with an automatic arm cuff, write down the readings, and reach me directly on the phone immediately if they are persistently > 180 systolic or if SBP is less than 100 or if lightheadedness develops. They were counseled to bring in all blood pressure readings and medications next visit.\par * The patient has been counseled that regular office followup (at least every 4 months for now)  is important for monitoring and for their health, and that it is their responsibility to make follow up appointments.\par * The patient also has been counseled that they must never stop or change any medications without discussing this with me (or another physician). \par \par # Hypokalemia.\par * Cont k-dur.

## 2022-08-09 LAB
25(OH)D3 SERPL-MCNC: 33.4 NG/ML
ALBUMIN SERPL ELPH-MCNC: 4.3 G/DL
ALP BLD-CCNC: 152 U/L
ALT SERPL-CCNC: 13 U/L
ANION GAP SERPL CALC-SCNC: 15 MMOL/L
APPEARANCE: ABNORMAL
AST SERPL-CCNC: 17 U/L
BASOPHILS # BLD AUTO: 0.05 K/UL
BASOPHILS NFR BLD AUTO: 0.7 %
BILIRUB SERPL-MCNC: 0.5 MG/DL
BILIRUBIN URINE: NEGATIVE
BLOOD URINE: NEGATIVE
BUN SERPL-MCNC: 26 MG/DL
CALCIUM SERPL-MCNC: 10 MG/DL
CALCIUM SERPL-MCNC: 10 MG/DL
CHLORIDE SERPL-SCNC: 106 MMOL/L
CHOLEST SERPL-MCNC: 154 MG/DL
CO2 SERPL-SCNC: 18 MMOL/L
COLOR: YELLOW
CREAT SERPL-MCNC: 1.62 MG/DL
CREAT SPEC-SCNC: 97 MG/DL
CREAT SPEC-SCNC: 97 MG/DL
CREAT/PROT UR: 0.9 RATIO
CYSTATIN C SERPL-MCNC: 2.02 MG/L
EGFR: 36 ML/MIN/1.73M2
EOSINOPHIL # BLD AUTO: 0.13 K/UL
EOSINOPHIL NFR BLD AUTO: 1.9 %
ESTIMATED AVERAGE GLUCOSE: 174 MG/DL
FERRITIN SERPL-MCNC: 240 NG/ML
GFR/BSA.PRED SERPLBLD CYS-BASED-ARV: 28 ML/MIN/1.73M2
GLUCOSE QUALITATIVE U: ABNORMAL
GLUCOSE SERPL-MCNC: 217 MG/DL
HBA1C MFR BLD HPLC: 7.7 %
HCT VFR BLD CALC: 37.2 %
HDLC SERPL-MCNC: 78 MG/DL
HGB BLD-MCNC: 11.7 G/DL
IMM GRANULOCYTES NFR BLD AUTO: 1.8 %
KETONES URINE: NEGATIVE
LDLC SERPL CALC-MCNC: 44 MG/DL
LEUKOCYTE ESTERASE URINE: NEGATIVE
LYMPHOCYTES # BLD AUTO: 1.19 K/UL
LYMPHOCYTES NFR BLD AUTO: 17.5 %
MAGNESIUM SERPL-MCNC: 2.2 MG/DL
MAN DIFF?: NORMAL
MCHC RBC-ENTMCNC: 27.1 PG
MCHC RBC-ENTMCNC: 31.5 GM/DL
MCV RBC AUTO: 86.1 FL
MICROALBUMIN 24H UR DL<=1MG/L-MCNC: 6.7 MG/DL
MICROALBUMIN/CREAT 24H UR-RTO: 69 MG/G
MONOCYTES # BLD AUTO: 0.7 K/UL
MONOCYTES NFR BLD AUTO: 10.3 %
NEUTROPHILS # BLD AUTO: 4.61 K/UL
NEUTROPHILS NFR BLD AUTO: 67.8 %
NITRITE URINE: NEGATIVE
NONHDLC SERPL-MCNC: 76 MG/DL
PARATHYROID HORMONE INTACT: 157 PG/ML
PH URINE: 6
PHOSPHATE SERPL-MCNC: 2.2 MG/DL
PLATELET # BLD AUTO: 257 K/UL
POTASSIUM SERPL-SCNC: 4 MMOL/L
PROT SERPL-MCNC: 7.3 G/DL
PROT UR-MCNC: 91 MG/DL
PROTEIN URINE: ABNORMAL
RBC # BLD: 4.32 M/UL
RBC # FLD: 17.1 %
SODIUM SERPL-SCNC: 138 MMOL/L
SPECIFIC GRAVITY URINE: 1.04
TRIGL SERPL-MCNC: 158 MG/DL
TSH SERPL-ACNC: 1.96 UIU/ML
UROBILINOGEN URINE: NORMAL
VIT B12 SERPL-MCNC: 1119 PG/ML
WBC # FLD AUTO: 6.8 K/UL

## 2022-08-14 LAB — GLUCOSE BLDC GLUCOMTR-MCNC: 186

## 2022-08-30 ENCOUNTER — RX RENEWAL (OUTPATIENT)
Age: 62
End: 2022-08-30

## 2022-11-21 ENCOUNTER — APPOINTMENT (OUTPATIENT)
Dept: ENDOCRINOLOGY | Facility: CLINIC | Age: 62
End: 2022-11-21

## 2022-11-21 VITALS
BODY MASS INDEX: 29.77 KG/M2 | SYSTOLIC BLOOD PRESSURE: 150 MMHG | HEART RATE: 84 BPM | DIASTOLIC BLOOD PRESSURE: 85 MMHG | WEIGHT: 168 LBS | HEIGHT: 63 IN

## 2022-11-21 DIAGNOSIS — E11.9 TYPE 2 DIABETES MELLITUS W/OUT COMPLICATIONS: ICD-10-CM

## 2022-11-21 PROCEDURE — 82962 GLUCOSE BLOOD TEST: CPT

## 2022-11-21 PROCEDURE — 99214 OFFICE O/P EST MOD 30 MIN: CPT | Mod: 25

## 2022-11-22 LAB — GLUCOSE BLDC GLUCOMTR-MCNC: 298

## 2022-11-23 NOTE — END OF VISIT
[Time Spent: ___ minutes] : I have spent [unfilled] minutes of time on the encounter. [FreeTextEntry3] : All medical record entries made by the Scribe were at my, Dr. Mike Zapata, direction and personally dictated by me on 11/21/2022. I have reviewed the chart and agree that the record accurately reflects my personal performance of the history, physical exam, assessment and plan. I have also personally directed, reviewed and agreed with the chart.

## 2022-11-23 NOTE — PHYSICAL EXAM
[Alert] : alert [Normal Sclera/Conjunctiva] : normal sclera/conjunctiva [Normal Outer Ear/Nose] : the ears and nose were normal in appearance [No Thyroid Nodules] : no palpable thyroid nodules [Clear to Auscultation] : lungs were clear to auscultation bilaterally [Normal S1, S2] : normal S1 and S2 [Normal Rate] : heart rate was normal [No Edema] : no peripheral edema [Normal Bowel Sounds] : normal bowel sounds [Spine Straight] : spine straight [Normal Gait] : normal gait [No Rash] : no rash [2+] : 2+ in the dorsalis pedis [No Tremors] : no tremors [Oriented x3] : oriented to person, place, and time [Acanthosis Nigricans] : no acanthosis nigricans

## 2022-11-23 NOTE — HISTORY OF PRESENT ILLNESS
[FreeTextEntry1] : 63 y/o F pt, with Hx of hypothyroidism after L hemithyroidectomy in  and Hx of DM (dx in ) \par # Hypothyroidism s/p L hemithyroidectomy in  for thyroid nodule (benign as per pt).\par \par # DM (dx )\par DM related complications of CKD. \par Relevant MHx: OU carcinoid (dx )\par Pertinent FHx: DM (mother, sister)\par Lifestyle: Walks a lot. Checks BS 2x a week- Onetouch. \par Ophthalmologist: Dr. Viridiana Sarkar ([facility] [place]), phone: [phone]. Last funduscopic visit: 2021 for OU carcinoid\par Follows with nephrologist Dr. Hutton for CKD stage 3, hypokalemia, metabolic acidosis and HTN. 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 \par - Pt never had symptoms of carcinoid syndrome. Pt received Sandostatin, Everolimus, 5FU. Will be on the Lutathera trial (PRRT) at [facility]. \par Pertinent PSHx: L lung lobectomy (), Heart ()\par Pertinent FHx: CA (father, grandmother)\par \par Other PMHx: HTN, Kidney stones (resolved), COVID (2022).\par PSHx: Cholecystectomy (), Kidney stone removal, hysterectomy b/l GAURAV (19),  x2 ( &), Laparoscopy \par FHx: heart disease (mother) \par SHx: Former smoker (quit in ). No etOH use. Works as a . \par COVID vaccination 2021, flu shot on 10/14/21.\par  \par 2022\par Pt has POCT 298, /85 and BMI 29.76. She lost 1 lb in 4 months. \par Today, pt is feeling okay, with c/o UTIs and gynecological complications due to Jardiance. \par Pt follows up with oncologist, Dr. Arevalo - PRRT (nuclear medicine treatment) trials at [facility] for carcinoid. Of note, this trial is Lutathera, indicated for pts with DEP-MEP. \par \par [Endocrine Medications verified as per pt on 2022]  \par Current Mediations: Jardiance 10 mg (rx 2022), Simvastatin 20 mg, Synthroid 50 mcg, Somatuline (Lanreotide; rx by her oncologist), Metoprolol 25 mg, Vit D, Prilosec 10 mg prn, ASA 81 mg, Potassium CL 10 mEq\par - Held: Sandostatin, Tradjenta 5 mg qd (started on 20), Trulicity 0.75 mg (DCed 2022)\par \par Medication modified/added this visit: Trulicity 0.75 mg qw (restarted 2022 because Jardiance side effects: UTI). Hold Jardiance 10 mg (rx 22; DCed 2022)\par

## 2022-11-23 NOTE — DATA REVIEWED
[FreeTextEntry1] : Scanned Labs:\par - 10/14/21: s.creat 1.68, Ca 9.5\par - 07/29/21: s.creat 1.71\par - 04/01/21: s.creat 1.76, LDL-c 51\par \par Scanned Imaging: \par - 12/20/2021 (compared to 11/2020 CT): CT Scan of chest, abdomen, and pelvis: The pancreas is mildly atrophic. Small b/l adrenal adenomas are again evident. L measures 1.7 cm and R measures 1.6 cm. It was remeasured similarly on the previous examination. These have not significantly changed. Impression: R thyroid lobe is absent. L thyroid 1.1 cm hypodense nodule. Small bilateral adrenal adenomas measuring 1.7 cm in the L and 1.6 cm in the R. This has not significantly changed.

## 2022-11-23 NOTE — ADDENDUM
[FreeTextEntry1] : I Melvisamantha Deutsch act soley as a scribe for Dr. Mike Zapata on this date. 11/21/2022

## 2022-11-23 NOTE — ASSESSMENT
[Other____] : Risks and benefits of [unfilled] was discussed with the patient. [Levothyroxine] : The patient was instructed to take Levothyroxine on an empty stomach, separate from vitamins, and wait at least 30 minutes before eating [Carbohydrate Consistent Diet] : carbohydrate consistent diet [Importance of Diet and Exercise] : importance of diet and exercise to improve glycemic control, achieve weight loss and improve cardiovascular health [Exercise/Effect on Glucose] : exercise/effect on glucose [Self Monitoring of Blood Glucose] : self monitoring of blood glucose [FreeTextEntry3] : Benefits and side effects including risk for pancreatitis/cholecystitis, gallbladder disease, thrombocytopenia and rare cases of angioedema were explained to pt.  \par Titrate slowly in patients with diabetic retinopathy to avoid rapid declines of A1c that might affect progression of retinopathy.

## 2022-11-23 NOTE — REVIEW OF SYSTEMS
[As Noted in HPI] : as noted in HPI [Negative] : Cardiovascular [Abdominal Pain] : no abdominal pain [Diarrhea] : no diarrhea [Hot Flashes] : no hot flashes [FreeTextEntry8] : UTI due to Jardiance.

## 2022-12-08 ENCOUNTER — APPOINTMENT (OUTPATIENT)
Dept: NEPHROLOGY | Facility: CLINIC | Age: 62
End: 2022-12-08

## 2023-02-14 ENCOUNTER — RX RENEWAL (OUTPATIENT)
Age: 63
End: 2023-02-14

## 2023-02-21 ENCOUNTER — APPOINTMENT (OUTPATIENT)
Dept: ENDOCRINOLOGY | Facility: CLINIC | Age: 63
End: 2023-02-21
Payer: MEDICARE

## 2023-02-21 VITALS
BODY MASS INDEX: 29.94 KG/M2 | SYSTOLIC BLOOD PRESSURE: 134 MMHG | DIASTOLIC BLOOD PRESSURE: 83 MMHG | HEART RATE: 86 BPM | WEIGHT: 169 LBS

## 2023-02-21 PROCEDURE — 99214 OFFICE O/P EST MOD 30 MIN: CPT | Mod: 25

## 2023-02-21 PROCEDURE — 82962 GLUCOSE BLOOD TEST: CPT

## 2023-02-21 NOTE — ASSESSMENT
[Carbohydrate Consistent Diet] : carbohydrate consistent diet [Importance of Diet and Exercise] : importance of diet and exercise to improve glycemic control, achieve weight loss and improve cardiovascular health [Exercise/Effect on Glucose] : exercise/effect on glucose [Self Monitoring of Blood Glucose] : self monitoring of blood glucose [Levothyroxine] : The patient was instructed to take Levothyroxine on an empty stomach, separate from vitamins, and wait at least 30 minutes before eating

## 2023-02-23 NOTE — ADDENDUM
[FreeTextEntry1] : I Melvisamantha Deutsch act soley as a scribe for Dr. Mike Zapata on this date. 02/21/2023

## 2023-02-23 NOTE — END OF VISIT
[FreeTextEntry3] : All medical record entries made by the Scribe were at my, Dr. Mike Zapata, direction and personally dictated by me on 02/21/2023. I have reviewed the chart and agree that the record accurately reflects my personal performance of the history, physical exam, assessment and plan. I have also personally directed, reviewed and agreed with the chart.  [Time Spent: ___ minutes] : I have spent [unfilled] minutes of time on the encounter.

## 2023-02-23 NOTE — HISTORY OF PRESENT ILLNESS
[FreeTextEntry1] : 61 y/o F pt, with Hx of hypothyroidism after L hemithyroidectomy in , Hx of DM (dx in ), and Hx of secondary Hyperparathyroidism.  \par # Hypothyroidism s/p L hemithyroidectomy in  for thyroid nodule (benign as per pt).\par \par # DM (dx )\par DM related complications of CKD. \par Relevant MHx: OU carcinoid (dx )\par Pertinent FHx: DM (mother, sister)\par Lifestyle: Walks a lot. Checks BS 2x a week- Onetouch. \par Ophthalmologist: Dr. Viridiana Sarkar ([facility] [place]), phone: [phone]. Last funduscopic visit:  for OU carcinoid\par Follows with nephrologist Dr. Hutton for CKD stage 3, hypokalemia, metabolic acidosis and HTN. 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 \par - Pt never had symptoms of carcinoid syndrome. Pt received Sandostatin, Everolimus, 5FU. Will be on the Lutathera trial (PRRT) at [facility]. \par Pertinent PSHx: L lung lobectomy (), Heart ()\par Pertinent FHx: CA (father, grandmother)\par \par Other PMHx: HTN, Kidney stones (resolved), COVID (2022).\par PSHx: Cholecystectomy (), Kidney stone removal, hysterectomy b/l GAURAV (19),  x2 ( &), Laparoscopy \par FHx: heart disease (mother) \par SHx: Former smoker (quit in ). No etOH use. Works as a . \par COVID vaccination 2021, flu shot on 10/14/21.\par  \par 2023\par Pt has POCT 232, /83 and BMI 29.94. No significant weight changes since 2022. \par Today, pt is feeling well, with no physical complaints. Trulicity was discontinued 2 months later (rx 2022) because of price. Only on Tradjenta. \par Last ophthalmologist visit was . \par Seeing specialist for carcinoid every 2 months. Received radiation and Lut[place]ra x3 at [place]. Of note, she notes higher BS since treatment. \par  \par [DM medications verified as per pt on 2023] \par Current Mediations: Tradjenta 5 mg,  Simvastatin 20 mg, Synthroid 50 mcg, Somatuline (Lanreotide; rx by her oncologist), Metoprolol 25 mg, Vit D, Prilosec 10 mg prn, ASA 81 mg, Potassium CL 10 mEq\par - Held: Sandostatin, Tradjenta 5 mg qd (started on 20), Jardiance 10 mg (rx 2022-22 due to UTI), Trulicity 0.75 mg qw (restarted 2022 because Jardiance side effects; DCed 22)\par \par Reviewed Data:\par - 02/15/23: s.creat 1.71 (H), BUN 34 (H), Glucose 347 (H), eGFR 31 (L)\par

## 2023-02-23 NOTE — DATA REVIEWED
[FreeTextEntry1] : Scanned Labs:\par - 02/15/23: s.creat 1.71 (H), BUN 34 (H), Glucose 347 (H), eGFR 31 (L)\par - 10/14/21: s.creat 1.68, Ca 9.5\par - 07/29/21: s.creat 1.71\par - 04/01/21: s.creat 1.76, LDL-c 51\par \par Scanned Imaging: \par - 12/20/2021 (compared to 11/2020 CT): CT Scan of chest, abdomen, and pelvis: The pancreas is mildly atrophic. Small b/l adrenal adenomas are again evident. L measures 1.7 cm and R measures 1.6 cm. It was remeasured similarly on the previous examination. These have not significantly changed. Impression: R thyroid lobe is absent. L thyroid 1.1 cm hypodense nodule. Small bilateral adrenal adenomas measuring 1.7 cm in the L and 1.6 cm in the R. This has not significantly changed.

## 2023-02-23 NOTE — REVIEW OF SYSTEMS
[Negative] : Genitourinary [Abdominal Pain] : no abdominal pain [Diarrhea] : no diarrhea [Hot Flashes] : no hot flashes

## 2023-02-24 LAB
ANION GAP SERPL CALC-SCNC: 14 MMOL/L
BUN SERPL-MCNC: 32 MG/DL
CALCIUM SERPL-MCNC: 10.3 MG/DL
CHLORIDE SERPL-SCNC: 105 MMOL/L
CHOLEST SERPL-MCNC: 163 MG/DL
CO2 SERPL-SCNC: 20 MMOL/L
CREAT SERPL-MCNC: 1.44 MG/DL
CREAT SPEC-SCNC: 138 MG/DL
EGFR: 41 ML/MIN/1.73M2
ESTIMATED AVERAGE GLUCOSE: 192 MG/DL
GLUCOSE BLDC GLUCOMTR-MCNC: 232
GLUCOSE SERPL-MCNC: 206 MG/DL
HBA1C MFR BLD HPLC: 8.3 %
HDLC SERPL-MCNC: 81 MG/DL
LDLC SERPL CALC-MCNC: 40 MG/DL
MICROALBUMIN 24H UR DL<=1MG/L-MCNC: 17.5 MG/DL
MICROALBUMIN/CREAT 24H UR-RTO: 127 MG/G
NONHDLC SERPL-MCNC: 82 MG/DL
POTASSIUM SERPL-SCNC: 4 MMOL/L
SODIUM SERPL-SCNC: 138 MMOL/L
TRIGL SERPL-MCNC: 208 MG/DL
TSH SERPL-ACNC: 1.73 UIU/ML

## 2023-02-27 RX ORDER — BLOOD-GLUCOSE CONTROL, NORMAL
EACH MISCELLANEOUS
Qty: 50 | Refills: 5 | Status: DISCONTINUED | COMMUNITY
Start: 2022-07-27 | End: 2023-02-27

## 2023-02-27 RX ORDER — ERGOCALCIFEROL 1.25 MG/1
1.25 MG CAPSULE, LIQUID FILLED ORAL
Qty: 20 | Refills: 3 | Status: DISCONTINUED | COMMUNITY
Start: 2019-06-02 | End: 2023-02-27

## 2023-02-27 RX ORDER — EMPAGLIFLOZIN 10 MG/1
10 TABLET, FILM COATED ORAL DAILY
Qty: 90 | Refills: 3 | Status: DISCONTINUED | COMMUNITY
Start: 2022-07-27 | End: 2023-02-27

## 2023-03-23 ENCOUNTER — APPOINTMENT (OUTPATIENT)
Dept: NEPHROLOGY | Facility: CLINIC | Age: 63
End: 2023-03-23
Payer: MEDICARE

## 2023-03-23 VITALS — SYSTOLIC BLOOD PRESSURE: 123 MMHG | DIASTOLIC BLOOD PRESSURE: 82 MMHG | HEART RATE: 92 BPM

## 2023-03-23 DIAGNOSIS — E87.6 HYPOKALEMIA: ICD-10-CM

## 2023-03-23 PROCEDURE — 99214 OFFICE O/P EST MOD 30 MIN: CPT

## 2023-03-23 NOTE — HISTORY OF PRESENT ILLNESS
[FreeTextEntry1] : Kindly referred by Dr. Manuel Arevalo on 31Jan19 for decreased kidney function. Covid vaccine x 3 2021. \par \par * Off jardiance due to yeast infections. * Receiving PRRT for carcinoid. * CKD stable, creatinine 1.44. * Hypokalemia controlled. * HTN controlled. No lightheadedness. No CP/SOB. Compliant with medications. * DM uncontrolled, HGA1c 8.3. * No sx of kidney stones. \par \par Previous history (08Aug22): * DM up to 7.4. * CKD stable, creatinine 1.6 on 6/15. * No symptoms of kidney stones. 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 * Hypokalemia controlled. * Trulicity switched to jardiance. * HTN controlled. No lightheadedness. No CP/SOB. Compliant with medications. * She is undergoing somatulin therapy for carcinoid. \par \par Previous history (30Nov21): * Off jardiance due to vaginal itching. * On trulicity. * CKD stable, creatinine 1.56. * No symptoms of kidney stones. * * Receiving monthly injections of somatulin for carcinoid. * Hypokalemia controlled. \par \par Previous history (22Jan21): * CKD stable, creatinine 1.69 (eGFR 32). 1 epsiode of hematuria. Mother may have had kidney disease. * Chronic back pain for 1 month. No neurologic symptoms. Following up with Dr. Arevalo. No numbness, weakness, CP or SOB. * No symptoms of kidney stones. Renal US 2018. \par \par * CKD stable, creatinine 1.81. Hypokalemia improved, K increased to 3.8. * Sodium bicarb restarted for bicarb of 17. * DM controlled, HGA1c 6.8. * Albuminuria decreased to 75. Labs checked by Dr. Arevalo in September. She will obtain labs. Reportedly labs were stable. * No symptoms of kidney stones. \par \par Previous history (28May20): * CKD previously stable, creatinine 1.97 in 1/20. * K-dur increased to 10 bid for K of 3.5. * Sodium bicarb (1/2 tsp TIW) restarted for HCO3 of 19. * Labs checked by Dr. Arevalo today. \par \par Previous history (10Jan20): * CKD stable. * Slight hypokalemia. * HCO3 19. She has not taken sodium bicarbonate. \par \par Previous history (07Oct19): * CKD stable, creatinine 2.13. * K 3.4. K-dur 10 meq started last visit. * Labs checked 1 week ago by Dr. Arevalo. (The patient has been advised to call their office and arrange copies of notes/labs to be sent.) * She is only taking sodium bicarbonate three times weekly. \par \par Previous history (27Aug19): * CKD stable, creatinine 2.13 in August 2019. * Slight hypokalemia, K 3.4. No diuretics, vomiting, or diarrhea. * HCO3 increased to 18 on sodium bicarbonate but she has taken this only three times weekly. \par \par Previous history (28May19): * S/P hysterectomy and bilateral GAURAV. 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 * CKD previous stable, creatinine 2.21. * K decreased to 2.9. Repeat K was improved in ED. She has increased in potassium in her diet. * Oral bicarb started for acidosis. (1 tsp)\par \par Previous history (26Mar19): * Progressive CKD. Creatinine 2.18. * HCO3 15 previously. Baking soda 650 bid started.  * HTN controlled. No lightheadedness. Compliant with medications. Following low salt diet. * Off metformin. * 1.6 g/g proteinuria. \par \par Previous history (20Feb19): * HTN controlled, 120s - 130s at home. No lightheadedness. Compliant with medications. Following low salt diet. * Proteinuria 1.6 g/g. *  Metformin was stopped last visit. Nausea resolved.* CKD stable, creatinine 2.1. * HCO3 15. She has not started baking soda as instructed. 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 She has never had flushing or carcinoid syndrome. Since 2013, she has been treated with monthly sandostatin and in 2014 was treated with everolimus (afinitor). In 2017, she was treated with 5FU (Nov 17 - July 18) and another type of chemotherapy, though she is not on this currently. She was diagnosed with diabetes in 2017 and started on metformin, which she is still taking. In 2017 she was also started on lisinopril by her cardiologist "to protect her kidneys". She first became aware of decreased kidney function in about November of 2018. She is uncertain what her creatinine is but believes her kidney function is 17 percent. (See below.) Labs have been requested and are currently pending. A renal ultrasound on 12/3/18 revealed 9.5/9.8 kidneys which were echogenic. There was no hydronephrosis. The patient denies exposure to chronic NSAIDs, phosphate bowel preparations, PPIs, green smoothies, creatine, or herbal supplements.\par \par One episode of kidney stone which passed spontaneously in 2004. \par \par ADDENDUM: Creatinine 2.07, GFR 25 on labs from 1/7/19. Per discussion with Dr. Arevalo, her creatinine has increased in May 2018. It has been stable since November 2018. \par \par \par

## 2023-03-23 NOTE — ASSESSMENT
[FreeTextEntry1] : # CKD stage 3 c/w DM nephropathy.\par * Will consider losartan 12.5.\par * Can't be on jardiance given yeast infections. \par * Therapies for kidney disease: blood pressure control; DM control; GLP1 agonist1; other evidence-based therapies including exercise, a plant-based lower oxalate diet, and 400 mcg folic acid daily\par * Cardiovascular disease prevention: counseling on healthy diet, physical activity, weight loss, alcohol limitation, blood pressure control; statin therapy; cardiology evaluation/followup advised\par * A counseling information sheet on CKD which they have been instructed to read has been given.\par * The patient has been counseled that chronic kidney disease is a significant condition and regular office followup with me (at least every 4 months for now) is important for monitoring and their health, and that it is their responsibility to make a follow up appointment.\par * The patient has been counseled never to stop taking their medications without discussing it with me or another doctor.\par * The patient has been counseled on avoiding NSAIDs.\par * The patient has been counseled on risk of worsening kidney function and instructed to immediately call and speak with me and go immediately to ER with any severe symptoms, nausea, vomiting, diarrhea, chest pain, or shortness of breath.\par \par # HTN controlled.\par * Cont metoprolol.\par * The patient's blood pressure was checked with the [employer] [id number] using the SPRINT trial protocol after sitting quietly in an empty room with arm supported, back supported, and feet on the floor for 5 minutes. The average of 3 readings were taken.\par * A counseling information sheet on blood pressure and staying healthy which they have been instructed to read has been given.\par * The patient has been counseled to check their BP at home with an automatic arm cuff, write down the readings, and reach me directly on the phone immediately if they are persistently > 180 systolic or if SBP is less than 100 or if lightheadedness develops. They were counseled to bring in all blood pressure readings and medications next visit.\par * The patient has been counseled that regular office followup (at least every 4 months for now) is important for monitoring and for their health, and that it is their responsibility to make follow up appointments.\par * The patient also has been counseled that they must never stop or change any medications without discussing this with me (or another physician). \par \par # Hypokalemia.\par * Cont k-dur. \par

## 2023-03-24 NOTE — HISTORY OF PRESENT ILLNESS
[FreeTextEntry1] : Kindly referred by Dr. Manuel Arevalo for decrease kidney function. 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 She has never had flushing or carcinoid syndrome. Since 2013, she has been treated with monthly sandostatin and in 2014 was treated with everolimus (afinitor). In 2017, she was treated with 5FU (Nov 17 - July 18) and another type of chemotherapy, though she is not on this currently. She was diagnosed with diabetes in 2017 and started on metformin, which she is still taking. In 2017 she was also started on lisinopril by her cardiologist "to protect her kidneys". She first became aware of decreased kidney function in about November of 2018. She is uncertain what her creatinine is but believes her kidney function is 17 percent. (See below.) Labs have been requested and are currently pending. A renal ultrasound on 12/3/18 revealed 9.5/9.8 kidneys which were echogenic. There was no hydronephrosis. The patient denies exposure to chronic NSAIDs, phosphate bowel preparations, PPIs, green smoothies, creatine, or herbal supplements.\par \par One episode of kidney stone which passed spontaneously in 2004. \par \par ADDENDUM: Creatinine 2.07, GFR 25 on labs from 1/7/19. Per discussion with Dr. Arevalo, her creatinine has increased in May 2018. It has been stable since November 2018. \par \par \par 
good balance

## 2023-04-18 RX ORDER — LINAGLIPTIN 5 MG/1
5 TABLET, FILM COATED ORAL DAILY
Qty: 90 | Refills: 3 | Status: ACTIVE | COMMUNITY
Start: 2023-01-09 | End: 1900-01-01

## 2023-04-26 ENCOUNTER — RESULT REVIEW (OUTPATIENT)
Age: 63
End: 2023-04-26

## 2023-04-26 ENCOUNTER — APPOINTMENT (OUTPATIENT)
Dept: MAMMOGRAPHY | Facility: CLINIC | Age: 63
End: 2023-04-26
Payer: MEDICARE

## 2023-04-26 ENCOUNTER — APPOINTMENT (OUTPATIENT)
Dept: ULTRASOUND IMAGING | Facility: CLINIC | Age: 63
End: 2023-04-26
Payer: MEDICARE

## 2023-04-26 PROCEDURE — 76641 ULTRASOUND BREAST COMPLETE: CPT | Mod: 50,GY

## 2023-04-26 PROCEDURE — 77067 SCR MAMMO BI INCL CAD: CPT

## 2023-04-26 PROCEDURE — 77063 BREAST TOMOSYNTHESIS BI: CPT

## 2023-04-27 ENCOUNTER — RX RENEWAL (OUTPATIENT)
Age: 63
End: 2023-04-27

## 2023-07-11 ENCOUNTER — RX RENEWAL (OUTPATIENT)
Age: 63
End: 2023-07-11

## 2023-08-14 ENCOUNTER — APPOINTMENT (OUTPATIENT)
Dept: ENDOCRINOLOGY | Facility: CLINIC | Age: 63
End: 2023-08-14
Payer: MEDICARE

## 2023-08-14 VITALS
BODY MASS INDEX: 29.23 KG/M2 | WEIGHT: 165 LBS | HEART RATE: 103 BPM | DIASTOLIC BLOOD PRESSURE: 85 MMHG | SYSTOLIC BLOOD PRESSURE: 128 MMHG | HEIGHT: 63 IN

## 2023-08-14 PROCEDURE — 82962 GLUCOSE BLOOD TEST: CPT

## 2023-08-14 PROCEDURE — 99215 OFFICE O/P EST HI 40 MIN: CPT | Mod: 25

## 2023-08-14 RX ORDER — BLOOD SUGAR DIAGNOSTIC
STRIP MISCELLANEOUS
Qty: 3 | Refills: 2 | Status: ACTIVE | COMMUNITY
Start: 2023-08-14 | End: 1900-01-01

## 2023-08-14 RX ORDER — INSULIN GLARGINE 100 [IU]/ML
100 INJECTION, SOLUTION SUBCUTANEOUS
Qty: 1 | Refills: 1 | Status: ACTIVE | COMMUNITY
Start: 2023-08-14 | End: 1900-01-01

## 2023-08-14 RX ORDER — BLOOD-GLUCOSE METER
W/DEVICE EACH MISCELLANEOUS
Qty: 1 | Refills: 0 | Status: ACTIVE | COMMUNITY
Start: 2023-08-14 | End: 1900-01-01

## 2023-08-14 NOTE — REVIEW OF SYSTEMS
[Negative] : Heme/Lymph [Recent Weight Loss (___ Lbs)] : recent weight loss: [unfilled] lbs [Nocturia] : nocturia [As Noted in HPI] : as noted in HPI [Blurred Vision] : no blurred vision [FreeTextEntry2] : She lost 4 lbs in 6 months.  [FreeTextEntry9] : endorses pins and needle sensation in LE

## 2023-08-14 NOTE — REASON FOR VISIT
[Follow - Up] : a follow-up visit [DM Type 2] : DM Type 2 [Hypothyroidism] : hypothyroidism [Other___] : [unfilled] [Adrenal Evaluation/Adrenal Disorder] : adrenal evaluation/adrenal disorder [Family Member] : family member

## 2023-08-14 NOTE — THERAPY
[Today's Date] : [unfilled] [Basaglar] : Basaglar [FreeTextEntry9] : 15 u qd [FreeTextEntry7] : Trulicity 1.5 mg qw, Simvastatin 20 mg

## 2023-08-14 NOTE — HISTORY OF PRESENT ILLNESS
[FreeTextEntry1] : 63 y/o F pt, with Hx of hypothyroidism after L hemithyroidectomy in , Hx of DM (dx in ), and Hx of secondary Hyperparathyroidism.   # Hypothyroidism s/p L hemithyroidectomy in  for thyroid nodule (benign as per pt).  # DM (dx ) DM related complications of CKD.  Relevant MHx: OU carcinoid (dx ) Pertinent FHx: DM (mother, sister) Lifestyle: Walks a lot. Checks BS 2x a week- Onetouch.  Ophthalmologist: Dr. Viridiana Sarkar ([facility] – [place]), phone: [phone]. Last funduscopic visit:  for OU carcinoid Follows with nephrologist Dr. Hutton for CKD stage 3, hypokalemia, metabolic acidosis and HTN.   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  - Pt never had symptoms of carcinoid syndrome. Pt received Sandostatin, Everolimus, 5FU. Will be on the Lutathera trial (PRRT) at [facility].  Pertinent PSHx: L lung lobectomy (), Heart () Pertinent FHx: CA (father, grandmother)  Other PMHx: Hx of carcinoma tumor found in lung and heart, HTN, Kidney stones (resolved), COVID (2022). PSHx: Cholecystectomy (), Kidney stone removal, hysterectomy b/l GAURAV (19),  x2 ( &), Laparoscopy  FHx: heart disease (mother)  SHx: Former smoker (quit in ). No etOH use. Works as a .  COVID vaccination 2021, flu shot on 10/14/21.   2023 Pt has POCT 232, /83 and BMI 29.94. No significant weight changes since 2022.  Today, pt is feeling well, with no physical complaints. Trulicity was discontinued 2 months later (rx 2022) because of price. Only on Tradjenta.  Last ophthalmologist visit was .  Seeing specialist for carcinoid every 2 months. Received radiation and Luthathera x3 at [place]. Of note, she notes higher BS since treatment. Reviewed Data: - 02/15/23: s.creat 1.71 (H), BUN 34 (H), Glucose 347 (H), eGFR 31 (L)  2023 Pt has POCT 429, /85 and BMI 29.23. She lost 4 lbs in 6 months.  CC: " I am feeling more tired than usual" Pt reports she found that her H&H and platelets are low due to the treatment for carcinoid (Lutathera which ended in April). She states she was prescribed Procrit.  As per pt, she has not been taking BS because she cannot find her meter. Last time was 3 weeks ago and her BS was in the 200s.  Pt endorsees losing weight, pins and needle sensation in LE, and nocturia. Pt denies blurry vision.  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   Review of pt's chart: - 23: H&H 10.8/34.1 - 23: TSH 1.73 .a 8.3%  - 21: CT Scan of Chest/Abdomen/Pelvis:  1. Small bilateral adrenal adenoma are again evident this measure 1.7 on the left and 1.6 on the right. Remeasured similarly on the previous exam, no significant changes.   2. R thyroid lobe is absent; L thyroid lobe heterogeneous with hypodense 1.0 cm nodule    [Medications verified as per pt on 2023]  Current Mediations: Trulicity 0.75 mg, Simvastatin 20 mg, Synthroid 50 mcg, Somatuline (Lanreotide; rx by her oncologist), Metoprolol 25 mg, Vit D, Prilosec 10 mg prn, ASA 81 mg, Potassium CL 10 mEq - Held: Sandostatin, Tradjenta 5 mg qd (started on 20), Jardiance 10 mg (rx 2022-22 due to UTI), Trulicity 0.75 mg qw (restarted 2022 because Jardiance side effects; DCed 22)  Medication modified/added this visit: Initiate Basaglar 15 u qd (rx 2023), Trulicity 1.5 mg qw (increased from 0.75 rx 2023)

## 2023-08-14 NOTE — ADDENDUM
[FreeTextEntry1] : I, Angelia Dejesus, act soley as a scribe for Dr. Mike Zapata on this date. 08/14/2023

## 2023-08-14 NOTE — ASSESSMENT
[Carbohydrate Consistent Diet] : carbohydrate consistent diet [Importance of Diet and Exercise] : importance of diet and exercise to improve glycemic control, achieve weight loss and improve cardiovascular health [Exercise/Effect on Glucose] : exercise/effect on glucose [Self Monitoring of Blood Glucose] : self monitoring of blood glucose [Levothyroxine] : The patient was instructed to take Levothyroxine on an empty stomach, separate from vitamins, and wait at least 30 minutes before eating [Diabetic Medications] : Risks and benefits of diabetic medications were discussed [Diabetes Foot Care] : diabetes foot care [Action and use of Insulin] : action and use of short and long-acting insulin [Insulin Self-Administration] : insulin self-administration

## 2023-08-14 NOTE — END OF VISIT
[FreeTextEntry3] : All medical record entries made by the Scribe were at my, Dr. Mike Zapata, direction and personally dictated by me on 08/14/2023. I have reviewed the chart and agree that the record accurately reflects my personal performance of the history, physical exam, assessment and plan. I have also personally directed, reviewed and agreed with the chart.  [Time Spent: ___ minutes] : I have spent [unfilled] minutes of time on the encounter.

## 2023-08-14 NOTE — PHYSICAL EXAM
[Normal Sclera/Conjunctiva] : normal sclera/conjunctiva [Normal Outer Ear/Nose] : the ears and nose were normal in appearance [No Thyroid Nodules] : no palpable thyroid nodules [Clear to Auscultation] : lungs were clear to auscultation bilaterally [Normal Rate] : heart rate was normal [No Edema] : no peripheral edema [Spine Straight] : spine straight [Normal Gait] : normal gait [No Rash] : no rash [2+] : 2+ in the dorsalis pedis [No Tremors] : no tremors [Oriented x3] : oriented to person, place, and time [No Acute Distress] : no acute distress [No Proptosis] : no proptosis [Regular Rhythm] : with a regular rhythm [Pedal Pulses Normal] : the pedal pulses are present [Soft] : abdomen soft [Normal Strength/Tone] : muscle strength and tone were normal [Normal Reflexes] : deep tendon reflexes were 2+ and symmetric [Acanthosis Nigricans] : no acanthosis nigricans

## 2023-08-14 NOTE — DATA REVIEWED
[FreeTextEntry1] : Scanned Labs: - 02/15/23: s.creat 1.71 (H), BUN 34 (H), Glucose 347 (H), eGFR 31 (L) - 10/14/21: s.creat 1.68, Ca 9.5 - 07/29/21: s.creat 1.71 - 04/01/21: s.creat 1.76, LDL-c 51  Scanned Imaging:  - 12/20/2021 (compared to 11/2020 CT): CT Scan of chest, abdomen, and pelvis: The pancreas is mildly atrophic. Small b/l adrenal adenomas are again evident. L measures 1.7 cm and R measures 1.6 cm. It was remeasured similarly on the previous examination. These have not significantly changed. Impression: R thyroid lobe is absent. L thyroid 1.1 cm hypodense nodule. Small bilateral adrenal adenomas measuring 1.7 cm in the L and 1.6 cm in the R. This has not significantly changed.

## 2023-08-18 ENCOUNTER — APPOINTMENT (OUTPATIENT)
Dept: ENDOCRINOLOGY | Facility: CLINIC | Age: 63
End: 2023-08-18
Payer: MEDICARE

## 2023-08-18 VITALS
WEIGHT: 165 LBS | BODY MASS INDEX: 29.23 KG/M2 | HEART RATE: 89 BPM | SYSTOLIC BLOOD PRESSURE: 131 MMHG | DIASTOLIC BLOOD PRESSURE: 73 MMHG

## 2023-08-18 LAB
ALBUMIN SERPL ELPH-MCNC: 4.6 G/DL
ALDOSTERONE SERUM: 52.6 NG/DL
ALP BLD-CCNC: 258 U/L
ALT SERPL-CCNC: 13 U/L
ANION GAP SERPL CALC-SCNC: 17 MMOL/L
AST SERPL-CCNC: 14 U/L
BILIRUB SERPL-MCNC: 0.6 MG/DL
BUN SERPL-MCNC: 34 MG/DL
C PEPTIDE SERPL-MCNC: 4.2 NG/ML
CALCIUM SERPL-MCNC: 10.1 MG/DL
CHLORIDE SERPL-SCNC: 100 MMOL/L
CHOLEST SERPL-MCNC: 170 MG/DL
CO2 SERPL-SCNC: 18 MMOL/L
CREAT SERPL-MCNC: 1.82 MG/DL
EGFR: 31 ML/MIN/1.73M2
ESTIMATED AVERAGE GLUCOSE: 209 MG/DL
GLUCOSE BLDC GLUCOMTR-MCNC: 160
GLUCOSE BLDC GLUCOMTR-MCNC: 429
GLUCOSE SERPL-MCNC: 399 MG/DL
HBA1C MFR BLD HPLC: 8.9 %
HCT VFR BLD CALC: 34.9 %
HDLC SERPL-MCNC: 86 MG/DL
HGB BLD-MCNC: 11.1 G/DL
LDLC SERPL CALC-MCNC: 56 MG/DL
MCHC RBC-ENTMCNC: 31.4 PG
MCHC RBC-ENTMCNC: 31.8 GM/DL
MCV RBC AUTO: 98.9 FL
NONHDLC SERPL-MCNC: 85 MG/DL
PLATELET # BLD AUTO: 169 K/UL
POTASSIUM SERPL-SCNC: 4.5 MMOL/L
PROT SERPL-MCNC: 7.7 G/DL
RBC # BLD: 3.53 M/UL
RBC # FLD: 16.2 %
SODIUM SERPL-SCNC: 135 MMOL/L
T3 SERPL-MCNC: 79 NG/DL
TRIGL SERPL-MCNC: 178 MG/DL
TSH SERPL-ACNC: 2.44 UIU/ML
WBC # FLD AUTO: 7.49 K/UL

## 2023-08-18 PROCEDURE — 82962 GLUCOSE BLOOD TEST: CPT

## 2023-08-18 PROCEDURE — 99214 OFFICE O/P EST MOD 30 MIN: CPT | Mod: 25

## 2023-08-18 NOTE — ASSESSMENT
[Diabetes Foot Care] : diabetes foot care [Carbohydrate Consistent Diet] : carbohydrate consistent diet [Importance of Diet and Exercise] : importance of diet and exercise to improve glycemic control, achieve weight loss and improve cardiovascular health [Exercise/Effect on Glucose] : exercise/effect on glucose [Action and use of Insulin] : action and use of short and long-acting insulin [Self Monitoring of Blood Glucose] : self monitoring of blood glucose [Insulin Self-Administration] : insulin self-administration [Diabetic Medications] : Risks and benefits of diabetic medications were discussed [Levothyroxine] : The patient was instructed to take Levothyroxine on an empty stomach, separate from vitamins, and wait at least 30 minutes before eating

## 2023-08-18 NOTE — REASON FOR VISIT
[Follow - Up] : a follow-up visit [Adrenal Evaluation/Adrenal Disorder] : adrenal evaluation/adrenal disorder [DM Type 2] : DM Type 2 [Hypothyroidism] : hypothyroidism [Other___] : [unfilled] [Family Member] : family member

## 2023-08-20 LAB — RENIN ACTIVITY, PLASMA: 1.08 NG/ML/HR

## 2023-08-20 NOTE — REVIEW OF SYSTEMS
[Recent Weight Loss (___ Lbs)] : recent weight loss: [unfilled] lbs [Nocturia] : nocturia [As Noted in HPI] : as noted in HPI [Negative] : Heme/Lymph [Blurred Vision] : no blurred vision [FreeTextEntry2] : She lost 4 lbs in 6 months.  [FreeTextEntry9] : endorses pins and needle sensation in LE

## 2023-08-20 NOTE — END OF VISIT
[FreeTextEntry3] : All medical record entries made by the Scribe were at my, Dr. Mike Zapata, direction and personally dictated by me on 08/18/2023. I have reviewed the chart and agree that the record accurately reflects my personal performance of the history, physical exam, assessment and plan. I have also personally, directed, reviewed and agreed with the chart  [Time Spent: ___ minutes] : I have spent [unfilled] minutes of time on the encounter.

## 2023-08-20 NOTE — HISTORY OF PRESENT ILLNESS
[FreeTextEntry1] : 61 y/o F pt, with Hx of hypothyroidism after L hemithyroidectomy in , Hx of DM (dx in ), and Hx of secondary Hyperparathyroidism.   # Hypothyroidism s/p L hemithyroidectomy in  for thyroid nodule (benign as per pt).  # DM (dx ) DM related complications of CKD.  Relevant MHx: OU carcinoid (dx ) Pertinent FHx: DM (mother, sister) Lifestyle: Walks a lot. Checks BS 2x a week- Onetouch.  Ophthalmologist: Dr. Viridiana Sarkar ([facility] – [place]), phone: [phone]. Last funduscopic visit:  for OU carcinoid Follows with nephrologist Dr. Hutton for CKD stage 3, hypokalemia, metabolic acidosis and HTN.   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  - Pt never had symptoms of carcinoid syndrome. Pt received Sandostatin, Everolimus, 5FU. Will be on the Lutathera trial (PRRT) at [facility].  Pertinent PSHx: L lung lobectomy (), Heart () Pertinent FHx: CA (father, grandmother)  Other PMHx: Hx of carcinoma tumor found in lung and heart, HTN, Kidney stones (resolved), COVID (2022). PSHx: Cholecystectomy (), Kidney stone removal, hysterectomy b/l GAURAV (19),  x2 ( &), Laparoscopy  FHx: heart disease (mother)  SHx: Former smoker (quit in ). No etOH use. Works as a .  COVID vaccination 2021, flu shot on 10/14/21.   2023 Pt has POCT 232, /83 and BMI 29.94. No significant weight changes since 2022.  Today, pt is feeling well, with no physical complaints. Trulicity was discontinued 2 months later (rx 2022) because of price. Only on Tradjenta.  Last ophthalmologist visit was .  Seeing specialist for carcinoid every 2 months. Received radiation and Luthathera x3 at [place]. Of note, she notes higher BS since treatment. Reviewed Data: - 02/15/23: s.creat 1.71 (H), BUN 34 (H), Glucose 347 (H), eGFR 31 (L)  2023 Pt has POCT 429, /85 and BMI 29.23. She lost 4 lbs in 6 months.  CC: " I am feeling more tired than usual" Pt reports she found that her H&H and platelets are low due to the treatment for carcinoid (Lutathera which ended in April). She states she was prescribed Procrit.  As per pt, she has not been taking BS because she cannot find her meter. Last time was 3 weeks ago and her BS was in the 200s.  Pt endorsees losing weight, pins and needle sensation in LE, and nocturia. Pt denies blurry vision.  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   Review of pt's chart: - 23: H&H 10.8/34.1 - 23: TSH 1.73 .a 8.3%  - 21: CT Scan of Chest/Abdomen/Pelvis:  1. Small bilateral adrenal adenoma are again evident this measure 1.7 on the left and 1.6 on the right. Remeasured similarly on the previous exam, no significant changes.   2. R thyroid lobe is absent; L thyroid lobe heterogeneous with hypodense 1.0 cm nodule    2023 Patient has POCT , /73 and BMI  29.23. Pt was accompanied by family member. CC: "  I am feeling well.  " She brought her BS records: FBS: 177, PPBS: 231, 307, 329, 261  Pt is eating 3 meals a day.    [Medications verified on 2023 as per pt]  Current Mediations: Basaglar 15 u qd Trulicity 1.5 mg qw (increased from 0.75 rx 2023), Novolog 5 u (rx 23) Simvastatin 20 mg, Synthroid 50 mcg, Somatuline (Lanreotide; rx by her oncologist), Metoprolol 25 mg, Vit D, Prilosec 10 mg prn, ASA 81 mg, Potassium CL 10 mEq   - Held: Sandostatin, Tradjenta 5 mg qd (started on 1/7/20), Jardiance 10 mg (rx 2022-22 due to UTI), Trulicity 0.75 mg qw (restarted 2022 because Jardiance side effects; DCed 22)     Medication modified/added this visit: Basaglar 22 u (increased from 15 u on 2023 ) ,Admelog 8 u

## 2023-08-20 NOTE — PHYSICAL EXAM
[No Acute Distress] : no acute distress [Normal Sclera/Conjunctiva] : normal sclera/conjunctiva [No Proptosis] : no proptosis [Normal Outer Ear/Nose] : the ears and nose were normal in appearance [No Thyroid Nodules] : no palpable thyroid nodules [Clear to Auscultation] : lungs were clear to auscultation bilaterally [Normal Rate] : heart rate was normal [Regular Rhythm] : with a regular rhythm [No Edema] : no peripheral edema [Pedal Pulses Normal] : the pedal pulses are present [Soft] : abdomen soft [Spine Straight] : spine straight [Normal Gait] : normal gait [Normal Strength/Tone] : muscle strength and tone were normal [No Rash] : no rash [2+] : 2+ in the dorsalis pedis [Normal Reflexes] : deep tendon reflexes were 2+ and symmetric [No Tremors] : no tremors [Oriented x3] : oriented to person, place, and time [Acanthosis Nigricans] : no acanthosis nigricans

## 2023-08-20 NOTE — THERAPY
[Today's Date] : [unfilled] [Basaglar] : Basaglar [Admelog] : Admelog [FreeTextEntry9] : 22 u qd [de-identified] : 8 u [FreeTextEntry7] : Trulicity 1.5 mg qw, Simvastatin 20 mg

## 2023-08-20 NOTE — ADDENDUM
[FreeTextEntry1] : I, Cassy Palomo, acted solely as a scribe for Dr. Mike Zapata on this date 08/18/2023

## 2023-08-29 ENCOUNTER — APPOINTMENT (OUTPATIENT)
Dept: NEPHROLOGY | Facility: CLINIC | Age: 63
End: 2023-08-29
Payer: MEDICARE

## 2023-08-29 VITALS — SYSTOLIC BLOOD PRESSURE: 127 MMHG | DIASTOLIC BLOOD PRESSURE: 77 MMHG | TEMPERATURE: 80 F

## 2023-08-29 VITALS — BODY MASS INDEX: 29.94 KG/M2 | WEIGHT: 169 LBS

## 2023-08-29 DIAGNOSIS — N20.0 CALCULUS OF KIDNEY: ICD-10-CM

## 2023-08-29 DIAGNOSIS — N18.9 CHRONIC KIDNEY DISEASE, UNSPECIFIED: ICD-10-CM

## 2023-08-29 DIAGNOSIS — N25.81 SECONDARY HYPERPARATHYROIDISM OF RENAL ORIGIN: ICD-10-CM

## 2023-08-29 DIAGNOSIS — D3A.00 BENIGN CARCINOID TUMOR OF UNSPECIFIED SITE: ICD-10-CM

## 2023-08-29 DIAGNOSIS — N18.4 CHRONIC KIDNEY DISEASE, STAGE 4 (SEVERE): ICD-10-CM

## 2023-08-29 DIAGNOSIS — I10 ESSENTIAL (PRIMARY) HYPERTENSION: ICD-10-CM

## 2023-08-29 PROCEDURE — 99214 OFFICE O/P EST MOD 30 MIN: CPT

## 2023-08-29 NOTE — HISTORY OF PRESENT ILLNESS
[FreeTextEntry1] : Kindly referred by Dr. Manuel Arevalo on 31Jan19 for decreased kidney function. Covid vaccine x 3 2021.    Receiving therapy for carcinoid and ESAs for anemia. * Off jardiance due to yeast infections.  Insulin started by Dr. Zapata. * CKD stable, creatinine 1.82. K 4.5. (Cr 1.4 - 1.8 range.)  127 mg albuminuria.   Previous history (23Mar23): * Off jardiance due to yeast infections. * Receiving PRRT for carcinoid. * CKD stable, creatinine 1.44. * Hypokalemia controlled. * HTN controlled. No lightheadedness. No CP/SOB. Compliant with medications. * DM uncontrolled, HGA1c 8.3. * No sx of kidney stones.   Previous history (08Aug22): * DM up to 7.4. * CKD stable, creatinine 1.6 on 6/15. * No symptoms of kidney stones. 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 * Hypokalemia controlled. * Trulicity switched to jardiance. * HTN controlled. No lightheadedness. No CP/SOB. Compliant with medications. * She is undergoing somatulin therapy for carcinoid.   Previous history (30Nov21): * Off jardiance due to vaginal itching. * On trulicity. * CKD stable, creatinine 1.56. * No symptoms of kidney stones. * * Receiving monthly injections of somatulin for carcinoid. * Hypokalemia controlled.   Previous history (22Jan21): * CKD stable, creatinine 1.69 (eGFR 32). 1 epsiode of hematuria. Mother may have had kidney disease. * Chronic back pain for 1 month. No neurologic symptoms. Following up with Dr. Arevalo. No numbness, weakness, CP or SOB. * No symptoms of kidney stones. Renal US 2018.   * CKD stable, creatinine 1.81. Hypokalemia improved, K increased to 3.8. * Sodium bicarb restarted for bicarb of 17. * DM controlled, HGA1c 6.8. * Albuminuria decreased to 75. Labs checked by Dr. Arevalo in September. She will obtain labs. Reportedly labs were stable. * No symptoms of kidney stones.   Previous history (28May20): * CKD previously stable, creatinine 1.97 in 1/20. * K-dur increased to 10 bid for K of 3.5. * Sodium bicarb (1/2 tsp TIW) restarted for HCO3 of 19. * Labs checked by Dr. Arevalo today.   Previous history (10Jan20): * CKD stable. * Slight hypokalemia. * HCO3 19. She has not taken sodium bicarbonate.   Previous history (07Oct19): * CKD stable, creatinine 2.13. * K 3.4. K-dur 10 meq started last visit. * Labs checked 1 week ago by Dr. Arevalo. (The patient has been advised to call their office and arrange copies of notes/labs to be sent.) * She is only taking sodium bicarbonate three times weekly.   Previous history (27Aug19): * CKD stable, creatinine 2.13 in August 2019. * Slight hypokalemia, K 3.4. No diuretics, vomiting, or diarrhea. * HCO3 increased to 18 on sodium bicarbonate but she has taken this only three times weekly.   Previous history (28May19): * S/P hysterectomy and bilateral GAURAV. 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 * CKD previous stable, creatinine 2.21. * K decreased to 2.9. Repeat K was improved in ED. She has increased in potassium in her diet. * Oral bicarb started for acidosis. (1 tsp)  Previous history (26Mar19): * Progressive CKD. Creatinine 2.18. * HCO3 15 previously. Baking soda 650 bid started.  * HTN controlled. No lightheadedness. Compliant with medications. Following low salt diet. * Off metformin. * 1.6 g/g proteinuria.   Previous history (20Feb19): * HTN controlled, 120s - 130s at home. No lightheadedness. Compliant with medications. Following low salt diet. * Proteinuria 1.6 g/g. *  Metformin was stopped last visit. Nausea resolved.* CKD stable, creatinine 2.1. * HCO3 15. She has not started baking soda as instructed.   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 She has never had flushing or carcinoid syndrome. Since 2013, she has been treated with monthly sandostatin and in 2014 was treated with everolimus (afinitor). In 2017, she was treated with 5FU (Nov 17 - July 18) and another type of chemotherapy, though she is not on this currently. She was diagnosed with diabetes in 2017 and started on metformin, which she is still taking. In 2017 she was also started on lisinopril by her cardiologist "to protect her kidneys". She first became aware of decreased kidney function in about November of 2018. She is uncertain what her creatinine is but believes her kidney function is 17 percent. (See below.) Labs have been requested and are currently pending. A renal ultrasound on 12/3/18 revealed 9.5/9.8 kidneys which were echogenic. There was no hydronephrosis. The patient denies exposure to chronic NSAIDs, phosphate bowel preparations, PPIs, green smoothies, creatine, or herbal supplements.  One episode of kidney stone which passed spontaneously in 2004.   ADDENDUM: Creatinine 2.07, GFR 25 on labs from 1/7/19. Per discussion with Dr. Arevalo, her creatinine has increased in May 2018. It has been stable since November 2018.

## 2023-08-29 NOTE — ASSESSMENT
[FreeTextEntry1] : # CKD stage 3 c/w DM nephropathy with albuminuria.   * Will consider losartan 12.5. Will not start currently as she is gong away for 3 months.   * Can't be on jardiance given yeast infections.  * Therapies for kidney disease: blood pressure control; DM control; GLP1 agonist1; other evidence-based therapies including exercise, a plant-based lower oxalate diet, and 400 mcg folic acid daily  * Cardiovascular disease prevention: counseling on healthy diet, physical activity, weight loss, alcohol limitation, blood pressure control; statin therapy; cardiology evaluation/followup advised  * A counseling information sheet on CKD which they have been instructed to read has been given.  * The patient has been counseled that chronic kidney disease is a significant condition and regular office followup with me (at least every 4 months for now) is important for monitoring and their health, and that it is their responsibility to make a follow up appointment.  * The patient has been counseled never to stop taking their medications without discussing it with me or another doctor.  * The patient has been counseled on avoiding NSAIDs.  * The patient has been counseled on risk of worsening kidney function and instructed to immediately call and speak with me and go immediately to ER with any severe symptoms, nausea, vomiting, diarrhea, chest pain, or shortness of breath.    # HTN controlled.  * Cont metoprolol.  * The patient's blood pressure was checked with the [employer] [id number] using the SPRINT trial protocol after sitting quietly in an empty room with arm supported, back supported, and feet on the floor for 5 minutes. The average of 3 readings were taken.  * A counseling information sheet on blood pressure and staying healthy which they have been instructed to read has been given.  * The patient has been counseled to check their BP at home with an automatic arm cuff, write down the readings, and reach me directly on the phone immediately if they are persistently > 180 systolic or if SBP is less than 100 or if lightheadedness develops. They were counseled to bring in all blood pressure readings and medications next visit.  * The patient has been counseled that regular office followup (at least every 4 months for now) is important for monitoring and for their health, and that it is their responsibility to make follow up appointments.  * The patient also has been counseled that they must never stop or change any medications without discussing this with me (or another physician).    # Hypokalemia.  * Cont k-dur.

## 2023-09-07 RX ORDER — DULAGLUTIDE 0.75 MG/.5ML
0.75 INJECTION, SOLUTION SUBCUTANEOUS
Qty: 1 | Refills: 3 | Status: ACTIVE | COMMUNITY
Start: 2023-02-27 | End: 1900-01-01

## 2023-09-07 RX ORDER — INSULIN LISPRO 100 U/ML
100 INJECTION, SOLUTION SUBCUTANEOUS
Qty: 2 | Refills: 2 | Status: DISCONTINUED | COMMUNITY
Start: 2023-08-18 | End: 2023-09-07

## 2023-09-07 RX ORDER — INSULIN LISPRO-AABC 100 [IU]/ML
100 INJECTION, SOLUTION SUBCUTANEOUS
Qty: 2 | Refills: 2 | Status: DISCONTINUED | COMMUNITY
Start: 2023-08-18 | End: 2023-09-07

## 2023-09-08 RX ORDER — INSULIN LISPRO 100 [IU]/ML
100 INJECTION, SOLUTION INTRAVENOUS; SUBCUTANEOUS
Qty: 5 | Refills: 1 | Status: ACTIVE | COMMUNITY
Start: 2023-09-08 | End: 1900-01-01

## 2023-09-08 RX ORDER — INSULIN ASPART 100 [IU]/ML
100 INJECTION, SOLUTION INTRAVENOUS; SUBCUTANEOUS
Qty: 1 | Refills: 1 | Status: DISCONTINUED | COMMUNITY
Start: 2023-08-14 | End: 2023-09-08

## 2023-09-25 ENCOUNTER — APPOINTMENT (OUTPATIENT)
Dept: ENDOCRINOLOGY | Facility: CLINIC | Age: 63
End: 2023-09-25
Payer: MEDICARE

## 2023-09-25 VITALS
DIASTOLIC BLOOD PRESSURE: 75 MMHG | HEART RATE: 105 BPM | BODY MASS INDEX: 29.76 KG/M2 | WEIGHT: 168 LBS | SYSTOLIC BLOOD PRESSURE: 135 MMHG

## 2023-09-25 PROCEDURE — 99215 OFFICE O/P EST HI 40 MIN: CPT

## 2023-09-27 LAB — GLUCOSE BLDC GLUCOMTR-MCNC: 138

## 2023-10-07 ENCOUNTER — RX RENEWAL (OUTPATIENT)
Age: 63
End: 2023-10-07

## 2023-10-07 RX ORDER — POTASSIUM CHLORIDE 750 MG/1
10 TABLET, EXTENDED RELEASE ORAL
Qty: 180 | Refills: 3 | Status: ACTIVE | COMMUNITY
Start: 2019-08-27 | End: 1900-01-01

## 2023-10-11 RX ORDER — LEVOTHYROXINE SODIUM 0.05 MG/1
50 TABLET ORAL
Qty: 90 | Refills: 0 | Status: ACTIVE | COMMUNITY
Start: 2019-01-31 | End: 1900-01-01

## 2023-12-04 ENCOUNTER — NON-APPOINTMENT (OUTPATIENT)
Age: 63
End: 2023-12-04

## 2023-12-05 ENCOUNTER — RX RENEWAL (OUTPATIENT)
Age: 63
End: 2023-12-05

## 2023-12-27 DIAGNOSIS — E89.0 POSTPROCEDURAL HYPOTHYROIDISM: ICD-10-CM

## 2024-01-03 ENCOUNTER — APPOINTMENT (OUTPATIENT)
Dept: ENDOCRINOLOGY | Facility: CLINIC | Age: 64
End: 2024-01-03
Payer: MEDICARE

## 2024-01-03 VITALS
BODY MASS INDEX: 29.95 KG/M2 | SYSTOLIC BLOOD PRESSURE: 130 MMHG | HEART RATE: 83 BPM | DIASTOLIC BLOOD PRESSURE: 72 MMHG | HEIGHT: 63 IN | WEIGHT: 169 LBS

## 2024-01-03 DIAGNOSIS — D35.00 BENIGN NEOPLASM OF UNSPECIFIED ADRENAL GLAND: ICD-10-CM

## 2024-01-03 PROCEDURE — 99215 OFFICE O/P EST HI 40 MIN: CPT

## 2024-01-05 PROBLEM — D35.00 ADRENAL ADENOMA: Status: ACTIVE | Noted: 2022-02-11

## 2024-01-05 NOTE — THERAPY
[Today's Date] : [unfilled] [Lantus] : Lantus [Humalog] : Humalog [FreeTextEntry9] : 23 u qd [de-identified] : 5 u ac [FreeTextEntry7] : Trulicity 1.5 mg qw, Simvastatin 20 mg

## 2024-01-05 NOTE — HISTORY OF PRESENT ILLNESS
[FreeTextEntry1] : 62 y/o F pt, with Hx of hypothyroidism after L hemithyroidectomy in , Hx of DM (dx in ), and Hx of secondary Hyperparathyroidism.   # Hypothyroidism s/p L hemithyroidectomy in  for thyroid nodule (benign as per pt).  # DM (dx 2017) DM related complications of CKD.  Relevant MHx: OU carcinoid (dx ) Pertinent FHx: DM (mother, sister) Lifestyle: Walks a lot. Checks BS 2x a week- Onetouch.  Ophthalmologist: Dr. Viridiana Sarkar ([facility] [place]), phone: [phone]. Last funduscopic visit:  for OU carcinoid Dr. Varun Rivera [phone], interventional radiologist.  Follows with nephrologist Dr. Hutton for CKD stage 3, hypokalemia, metabolic acidosis and HTN.   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  - Pt never had symptoms of carcinoid syndrome. Pt received Sandostatin, Everolimus, 5FU. Will be on the Lutathera trial (PRRT) at [facility].  Pertinent PSHx: L lung lobectomy (), Heart () Pertinent FHx: CA (father, grandmother)  Other PMHx: Hx of carcinoma tumor found in lung and heart, HTN, Kidney stones (resolved), COVID (2022). PSHx: Cholecystectomy (), Kidney stone removal, hysterectomy b/l GAURAV (19),  x2 ( &), Laparoscopy  FHx: heart disease (mother)  SHx: Former smoker (quit in ). No etOH use. Works as a .  COVID vaccination 2021, flu shot on 10/14/21.  2023 Patient has POCT , /73 and BMI  29.23. Pt was accompanied by family member. CC: "  I am feeling well.  " She brought her BS records: FBS: 177, PPBS: 231, 307, 329, 261  Pt is eating 3 meals a day.    2023 Pt has POCT 138, /75 and BMI 29.76. No significant weight change.  CC: "I am feeling well" Pt reports no physical complaints. She reports on a few occasions she missed her insulin injections as she would not carry it. She states she now knows she can carry it without it being refrigerated.  Pt brought in BS monitor: , 178, 167, 163, 176. PPBS: 254, 181, 223, 211, 256, 233.  Review of Labs:  - 23: Aldosterone 52 (compared to 2019 Aldosterone 38.9), Renin 1.085, A1c 8.9% Review of Imaging:  - 2022 CAT Scan of Chest, Abdomen, and Pelvis: IMPRESSION: Small bilateral adrenal adenomas. L: 1.7, R: 1.6. Remeasured from the previous examination shows no significant changes.   2024  Pt has POCT _, /72 and BMI 29.94. No significant weight change. CC: " I am doing okay" Pt reports that she suffered a cold/viral infection that lasted for 3 weeks that presented with coughing. 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 On 23 I spoke with Dr. Rivera and the plan for her liver treatment was held because the pt was having excessive GI symptoms. Since then, we have held Trulicity. Blood sugar readings are in normal range.   [Medications verified as per pt on 2024]  Current Mediations:  Lantus 23 u qd (increase from 20 u qd 2023), Humalog 5 u ac , Simvastatin 20 mg, Synthroid 50 mcg, Somatuline injection monthly, Metoprolol 25 mg qd, Vit D, Prilosec 10 mg prn, ASA 81 mg, Potassium CL 10 mEq   -- Held: Sandostatin, Tradjenta 5 mg qd (started on 20), Jardiance 10 mg (rx 2022-22 due to UTI), Trulicity 0.75 mg qw (restarted 2022 because Jardiance side effects; DCed 22) Trulicity 1.5 mg qw (increase from 0.75 mg qw 2023, held on 23)   Medication modified/added this visit:

## 2024-01-05 NOTE — END OF VISIT
[FreeTextEntry3] :  All medical record entries made by the Scribe were at my, Dr. Mike Zapata, direction and personally dictated by me on 01/03/2024. I have reviewed the chart and agree that the record accurately reflects my personal performance of the history, physical exam, assessment and plan. I have also personally directed, reviewed and agreed with the chart.  [Time Spent: ___ minutes] : I have spent [unfilled] minutes of time on the encounter.

## 2024-01-05 NOTE — PHYSICAL EXAM
[No Acute Distress] : no acute distress [Normal Sclera/Conjunctiva] : normal sclera/conjunctiva [No Proptosis] : no proptosis [Normal Outer Ear/Nose] : the ears and nose were normal in appearance [No Thyroid Nodules] : no palpable thyroid nodules [Clear to Auscultation] : lungs were clear to auscultation bilaterally [Normal Rate] : heart rate was normal [No Edema] : no peripheral edema [Pedal Pulses Normal] : the pedal pulses are present [Soft] : abdomen soft [Spine Straight] : spine straight [Normal Gait] : normal gait [Normal Strength/Tone] : muscle strength and tone were normal [No Rash] : no rash [2+] : 2+ in the dorsalis pedis [Normal Reflexes] : deep tendon reflexes were 2+ and symmetric [No Tremors] : no tremors [Oriented x3] : oriented to person, place, and time [Acanthosis Nigricans] : no acanthosis nigricans

## 2024-01-05 NOTE — ADDENDUM
[FreeTextEntry1] : I, Jose Alberto You act solely as a scribe for Dr. Mike Zapata on this date 01/03/2024

## 2024-03-01 ENCOUNTER — RX RENEWAL (OUTPATIENT)
Age: 64
End: 2024-03-01

## 2024-03-21 ENCOUNTER — TRANSCRIPTION ENCOUNTER (OUTPATIENT)
Age: 64
End: 2024-03-21

## 2024-03-21 DIAGNOSIS — E11.65 TYPE 2 DIABETES MELLITUS WITH HYPERGLYCEMIA: ICD-10-CM

## 2024-03-21 DIAGNOSIS — E03.9 HYPOTHYROIDISM, UNSPECIFIED: ICD-10-CM

## 2024-04-01 ENCOUNTER — APPOINTMENT (OUTPATIENT)
Dept: ENDOCRINOLOGY | Facility: CLINIC | Age: 64
End: 2024-04-01

## 2024-04-10 ENCOUNTER — RX RENEWAL (OUTPATIENT)
Age: 64
End: 2024-04-10

## 2024-04-10 RX ORDER — METOPROLOL SUCCINATE 25 MG/1
25 TABLET, EXTENDED RELEASE ORAL
Qty: 90 | Refills: 0 | Status: ACTIVE | COMMUNITY
Start: 2019-01-31 | End: 1900-01-01

## 2024-04-11 RX ORDER — BLOOD SUGAR DIAGNOSTIC
STRIP MISCELLANEOUS
Qty: 3 | Refills: 2 | Status: ACTIVE | COMMUNITY
Start: 2023-02-27

## 2024-04-11 RX ORDER — BLOOD-GLUCOSE METER
EACH MISCELLANEOUS
Qty: 100 | Refills: 2 | Status: ACTIVE | COMMUNITY
Start: 2023-02-27 | End: 1900-01-01

## 2024-04-25 RX ORDER — INSULIN LISPRO-AABC 100 [IU]/ML
100 INJECTION, SOLUTION SUBCUTANEOUS
Qty: 1 | Refills: 1 | Status: ACTIVE | COMMUNITY
Start: 2023-10-11 | End: 1900-01-01

## 2024-05-06 RX ORDER — PEN NEEDLE, DIABETIC 29 G X1/2"
32G X 4 MM NEEDLE, DISPOSABLE MISCELLANEOUS
Qty: 2 | Refills: 3 | Status: ACTIVE | COMMUNITY
Start: 2023-08-14 | End: 1900-01-01

## 2024-06-14 ENCOUNTER — APPOINTMENT (OUTPATIENT)
Dept: NEPHROLOGY | Facility: CLINIC | Age: 64
End: 2024-06-14

## 2024-06-18 RX ORDER — LANCETS 33 GAUGE
EACH MISCELLANEOUS
Qty: 300 | Refills: 2 | Status: ACTIVE | COMMUNITY
Start: 2023-08-14 | End: 1900-01-01

## 2024-07-08 ENCOUNTER — RX RENEWAL (OUTPATIENT)
Age: 64
End: 2024-07-08

## 2024-10-26 ENCOUNTER — RX RENEWAL (OUTPATIENT)
Age: 64
End: 2024-10-26

## 2024-12-11 ENCOUNTER — RX RENEWAL (OUTPATIENT)
Age: 64
End: 2024-12-11

## 2025-01-01 ENCOUNTER — RX RENEWAL (OUTPATIENT)
Age: 65
End: 2025-01-01

## 2025-01-03 NOTE — REVIEW OF SYSTEMS
----- Message from Mili sent at 1/3/2025  2:57 PM CST -----  Regarding: large amount of blood in stool  Symptom: Stools - Blood Mixed In  Outcome: Transfer to a nurse or provider NOW!  Reason: Large amount of blood    The caller accepted this outcome.   [Nasal Discharge] : nasal discharge [Sore Throat] : sore throat [Hoarseness] : hoarseness [Easy Bruising] : a tendency for easy bruising [Negative] : Heme/Lymph [FreeTextEntry4] : common cold, no fevers  [FreeTextEntry6] : post nasal dripo